# Patient Record
Sex: MALE | Race: WHITE | NOT HISPANIC OR LATINO | Employment: FULL TIME | URBAN - METROPOLITAN AREA
[De-identification: names, ages, dates, MRNs, and addresses within clinical notes are randomized per-mention and may not be internally consistent; named-entity substitution may affect disease eponyms.]

---

## 2017-08-11 LAB
ALBUMIN SERPL-MCNC: 5 G/DL (ref 3.6–4.8)
ALBUMIN/GLOB SERPL: 2.3 {RATIO} (ref 1.2–2.2)
ALP SERPL-CCNC: 64 IU/L (ref 39–117)
ALT SERPL-CCNC: 55 IU/L (ref 0–44)
AST SERPL-CCNC: 23 IU/L (ref 0–40)
BASOPHILS # BLD AUTO: 0 X10E3/UL (ref 0–0.2)
BASOPHILS NFR BLD AUTO: 0 %
BILIRUB SERPL-MCNC: 0.6 MG/DL (ref 0–1.2)
BUN SERPL-MCNC: 15 MG/DL (ref 8–27)
BUN/CREAT SERPL: 18 (ref 10–24)
CALCIUM SERPL-MCNC: 9.6 MG/DL (ref 8.6–10.2)
CHLORIDE SERPL-SCNC: 97 MMOL/L (ref 96–106)
CHOLEST SERPL-MCNC: 211 MG/DL (ref 100–199)
CO2 SERPL-SCNC: 23 MMOL/L (ref 18–29)
CREAT SERPL-MCNC: 0.84 MG/DL (ref 0.76–1.27)
EOSINOPHIL # BLD AUTO: 0.1 X10E3/UL (ref 0–0.4)
EOSINOPHIL NFR BLD AUTO: 1 %
ERYTHROCYTE [DISTWIDTH] IN BLOOD BY AUTOMATED COUNT: 13.1 % (ref 12.3–15.4)
FT4I SERPL CALC-MCNC: 2 (ref 1.2–4.9)
GLOBULIN SER CALC-MCNC: 2.2 G/DL (ref 1.5–4.5)
GLUCOSE SERPL-MCNC: 105 MG/DL (ref 65–99)
HBA1C MFR BLD: 5.7 % (ref 4.8–5.6)
HCT VFR BLD AUTO: 47.2 % (ref 37.5–51)
HCV AB S/CO SERPL IA: <0.1 S/CO RATIO (ref 0–0.9)
HDLC SERPL-MCNC: 58 MG/DL
HGB BLD-MCNC: 15.9 G/DL (ref 12.6–17.7)
HIV 1+2 AB+HIV1 P24 AG SERPL QL IA: NON REACTIVE
IMM GRANULOCYTES # BLD: 0 X10E3/UL (ref 0–0.1)
IMM GRANULOCYTES NFR BLD: 0 %
LABORATORY COMMENT REPORT: NORMAL
LDLC SERPL CALC-MCNC: 130 MG/DL (ref 0–99)
LYMPHOCYTES # BLD AUTO: 2.3 X10E3/UL (ref 0.7–3.1)
LYMPHOCYTES NFR BLD AUTO: 39 %
MCH RBC QN AUTO: 30.9 PG (ref 26.6–33)
MCHC RBC AUTO-ENTMCNC: 33.7 G/DL (ref 31.5–35.7)
MCV RBC AUTO: 92 FL (ref 79–97)
MONOCYTES # BLD AUTO: 0.5 X10E3/UL (ref 0.1–0.9)
MONOCYTES NFR BLD AUTO: 8 %
NEUTROPHILS # BLD AUTO: 3 X10E3/UL (ref 1.4–7)
NEUTROPHILS NFR BLD AUTO: 52 %
ONE SPECIMEN IDENTIFIER: NORMAL
PLATELET # BLD AUTO: 168 X10E3/UL (ref 150–379)
POTASSIUM SERPL-SCNC: 4.5 MMOL/L (ref 3.5–5.2)
PROT SERPL-MCNC: 7.2 G/DL (ref 6–8.5)
PSA SERPL-MCNC: 0.2 NG/ML (ref 0–4)
RBC # BLD AUTO: 5.14 X10E6/UL (ref 4.14–5.8)
SODIUM SERPL-SCNC: 137 MMOL/L (ref 134–144)
T3RU NFR SERPL: 28 % (ref 24–39)
T4 SERPL-MCNC: 7.2 UG/DL (ref 4.5–12)
TRIGL SERPL-MCNC: 116 MG/DL (ref 0–149)
TSH SERPL DL<=0.005 MIU/L-ACNC: 2.35 UIU/ML (ref 0.45–4.5)
VLDLC SERPL CALC-MCNC: 23 MG/DL (ref 5–40)
WBC # BLD AUTO: 5.8 X10E3/UL (ref 3.4–10.8)

## 2017-10-27 ENCOUNTER — OFFICE VISIT (OUTPATIENT)
Dept: FAMILY MEDICINE CLINIC | Facility: CLINIC | Age: 62
End: 2017-10-27

## 2017-10-27 VITALS
HEIGHT: 66 IN | HEART RATE: 86 BPM | DIASTOLIC BLOOD PRESSURE: 86 MMHG | BODY MASS INDEX: 31 KG/M2 | SYSTOLIC BLOOD PRESSURE: 122 MMHG | WEIGHT: 192.9 LBS | OXYGEN SATURATION: 96 %

## 2017-10-27 DIAGNOSIS — I10 ESSENTIAL HYPERTENSION: Primary | ICD-10-CM

## 2017-10-27 DIAGNOSIS — E78.5 DYSLIPIDEMIA: ICD-10-CM

## 2017-10-27 DIAGNOSIS — R73.01 IFG (IMPAIRED FASTING GLUCOSE): ICD-10-CM

## 2017-10-27 DIAGNOSIS — K21.9 GASTROESOPHAGEAL REFLUX DISEASE, ESOPHAGITIS PRESENCE NOT SPECIFIED: ICD-10-CM

## 2017-10-27 DIAGNOSIS — J30.9 CHRONIC ALLERGIC RHINITIS, UNSPECIFIED SEASONALITY, UNSPECIFIED TRIGGER: ICD-10-CM

## 2017-10-27 PROCEDURE — 99204 OFFICE O/P NEW MOD 45 MIN: CPT | Performed by: INTERNAL MEDICINE

## 2017-10-27 RX ORDER — ROSUVASTATIN CALCIUM 10 MG/1
10 TABLET, COATED ORAL DAILY
COMMUNITY
End: 2021-08-20 | Stop reason: SDUPTHER

## 2017-10-27 RX ORDER — PANTOPRAZOLE SODIUM 40 MG/1
40 TABLET, DELAYED RELEASE ORAL DAILY
Qty: 30 TABLET | Refills: 2 | Status: SHIPPED | OUTPATIENT
Start: 2017-10-27 | End: 2021-08-20

## 2017-10-27 RX ORDER — RANITIDINE 150 MG/1
150 TABLET ORAL 2 TIMES DAILY
COMMUNITY
End: 2017-10-27 | Stop reason: ALTCHOICE

## 2017-10-27 RX ORDER — IRBESARTAN 150 MG/1
150 TABLET ORAL NIGHTLY
COMMUNITY
End: 2019-10-11

## 2017-11-21 ENCOUNTER — TRANSCRIBE ORDERS (OUTPATIENT)
Dept: GASTROENTEROLOGY | Facility: CLINIC | Age: 62
End: 2017-11-21

## 2017-11-21 DIAGNOSIS — K21.9 GASTROESOPHAGEAL REFLUX DISEASE, ESOPHAGITIS PRESENCE NOT SPECIFIED: ICD-10-CM

## 2017-11-21 DIAGNOSIS — Z86.010 HX OF ADENOMATOUS COLONIC POLYPS: Primary | ICD-10-CM

## 2017-11-21 PROBLEM — Z86.0101 HX OF ADENOMATOUS COLONIC POLYPS: Status: ACTIVE | Noted: 2017-11-21

## 2017-12-08 ENCOUNTER — HOSPITAL ENCOUNTER (OUTPATIENT)
Facility: HOSPITAL | Age: 62
Setting detail: HOSPITAL OUTPATIENT SURGERY
Discharge: HOME | End: 2017-12-08
Attending: INTERNAL MEDICINE | Admitting: INTERNAL MEDICINE

## 2017-12-08 ENCOUNTER — ANESTHESIA EVENT (OUTPATIENT)
Dept: GASTROENTEROLOGY | Facility: HOSPITAL | Age: 62
End: 2017-12-08

## 2017-12-08 ENCOUNTER — ANESTHESIA (OUTPATIENT)
Dept: GASTROENTEROLOGY | Facility: HOSPITAL | Age: 62
End: 2017-12-08

## 2017-12-08 VITALS
WEIGHT: 184.4 LBS | OXYGEN SATURATION: 95 % | RESPIRATION RATE: 16 BRPM | SYSTOLIC BLOOD PRESSURE: 121 MMHG | DIASTOLIC BLOOD PRESSURE: 80 MMHG | TEMPERATURE: 98.5 F | HEIGHT: 66 IN | HEART RATE: 61 BPM | BODY MASS INDEX: 29.63 KG/M2

## 2017-12-08 DIAGNOSIS — K21.9 GASTROESOPHAGEAL REFLUX DISEASE, ESOPHAGITIS PRESENCE NOT SPECIFIED: ICD-10-CM

## 2017-12-08 DIAGNOSIS — Z86.010 HX OF ADENOMATOUS COLONIC POLYPS: ICD-10-CM

## 2017-12-08 PROCEDURE — 88312 SPECIAL STAINS GROUP 1: CPT | Performed by: INTERNAL MEDICINE

## 2017-12-08 PROCEDURE — 87081 CULTURE SCREEN ONLY: CPT | Performed by: INTERNAL MEDICINE

## 2017-12-08 PROCEDURE — 45380 COLONOSCOPY AND BIOPSY: CPT | Performed by: INTERNAL MEDICINE

## 2017-12-08 PROCEDURE — 88305 TISSUE EXAM BY PATHOLOGIST: CPT | Performed by: INTERNAL MEDICINE

## 2017-12-08 PROCEDURE — 25010000002 PROPOFOL 10 MG/ML EMULSION: Performed by: ANESTHESIOLOGY

## 2017-12-08 PROCEDURE — 43239 EGD BIOPSY SINGLE/MULTIPLE: CPT | Performed by: INTERNAL MEDICINE

## 2017-12-08 RX ORDER — LORATADINE 10 MG/1
10 TABLET ORAL DAILY
COMMUNITY

## 2017-12-08 RX ORDER — FLUTICASONE PROPIONATE 50 MCG
2 SPRAY, SUSPENSION (ML) NASAL DAILY
COMMUNITY

## 2017-12-08 RX ORDER — SODIUM CHLORIDE, SODIUM LACTATE, POTASSIUM CHLORIDE, CALCIUM CHLORIDE 600; 310; 30; 20 MG/100ML; MG/100ML; MG/100ML; MG/100ML
30 INJECTION, SOLUTION INTRAVENOUS CONTINUOUS PRN
Status: DISCONTINUED | OUTPATIENT
Start: 2017-12-08 | End: 2017-12-08 | Stop reason: HOSPADM

## 2017-12-08 RX ORDER — PROPOFOL 10 MG/ML
VIAL (ML) INTRAVENOUS CONTINUOUS PRN
Status: DISCONTINUED | OUTPATIENT
Start: 2017-12-08 | End: 2017-12-08 | Stop reason: SURG

## 2017-12-08 RX ORDER — PROPOFOL 10 MG/ML
VIAL (ML) INTRAVENOUS AS NEEDED
Status: DISCONTINUED | OUTPATIENT
Start: 2017-12-08 | End: 2017-12-08 | Stop reason: SURG

## 2017-12-08 RX ORDER — LIDOCAINE HYDROCHLORIDE 20 MG/ML
INJECTION, SOLUTION INFILTRATION; PERINEURAL AS NEEDED
Status: DISCONTINUED | OUTPATIENT
Start: 2017-12-08 | End: 2017-12-08 | Stop reason: SURG

## 2017-12-08 RX ORDER — SODIUM CHLORIDE 0.9 % (FLUSH) 0.9 %
1-10 SYRINGE (ML) INJECTION AS NEEDED
Status: DISCONTINUED | OUTPATIENT
Start: 2017-12-08 | End: 2017-12-08 | Stop reason: HOSPADM

## 2017-12-08 RX ADMIN — SODIUM CHLORIDE, POTASSIUM CHLORIDE, SODIUM LACTATE AND CALCIUM CHLORIDE 30 ML/HR: 600; 310; 30; 20 INJECTION, SOLUTION INTRAVENOUS at 13:59

## 2017-12-08 RX ADMIN — PROPOFOL 100 MCG/KG/MIN: 10 INJECTION, EMULSION INTRAVENOUS at 15:30

## 2017-12-08 RX ADMIN — LIDOCAINE HYDROCHLORIDE 60 MG: 20 INJECTION, SOLUTION INFILTRATION; PERINEURAL at 15:34

## 2017-12-08 RX ADMIN — PROPOFOL 100 MG: 10 INJECTION, EMULSION INTRAVENOUS at 15:30

## 2017-12-08 NOTE — PLAN OF CARE
Problem: Patient Care Overview (Adult)  Goal: Plan of Care Review  Outcome: Ongoing (interventions implemented as appropriate)    12/08/17 1338   Coping/Psychosocial Response Interventions   Plan Of Care Reviewed With patient       Goal: Adult Individualization and Mutuality  Outcome: Ongoing (interventions implemented as appropriate)  Goal: Discharge Needs Assessment  Outcome: Ongoing (interventions implemented as appropriate)    12/08/17 1338   Discharge Needs Assessment   Concerns To Be Addressed no discharge needs identified   Discharge Disposition home or self-care   Living Environment   Transportation Available car;family or friend will provide         Problem: GI Endoscopy (Adult)  Goal: Signs and Symptoms of Listed Potential Problems Will be Absent or Manageable (GI Endoscopy)  Outcome: Ongoing (interventions implemented as appropriate)    12/08/17 1338   GI Endoscopy   Problems Assessed (GI Endoscopy) all   Problems Present (GI Endoscopy) none

## 2017-12-08 NOTE — ANESTHESIA POSTPROCEDURE EVALUATION
Patient: Beto Bassett MD    Procedure Summary     Date Anesthesia Start Anesthesia Stop Room / Location    12/08/17 1527 1608  [facility] /  [facility]       Procedure Diagnosis Surgeon Provider    COLONOSCOPY TO CECUM AND INTO TERMINAL ILEUM WITH COLD BIOPSY POLYPECTOMY (N/A ); ESOPHAGOGASTRODUODENOSCOPY WITH COLD BIOPSIES (N/A Esophagus) Hx of adenomatous colonic polyps; Gastroesophageal reflux disease, esophagitis presence not specified  (Hx of adenomatous colonic polyps [Z86.010]; Gastroesophageal reflux disease, esophagitis presence not specified [K21.9]) MD Kristal Mcclellan MD          Anesthesia Type: MAC  Last vitals  BP   100/73 (12/08/17 1608)   Temp   36.9 °C (98.5 °F) (12/08/17 1353)   Pulse   72 (12/08/17 1608)   Resp   14 (12/08/17 1608)     SpO2   95 % (12/08/17 1608)     Post Anesthesia Care and Evaluation    Patient location during evaluation: bedside  Patient participation: complete - patient participated  Level of consciousness: awake  Pain management: adequate  Airway patency: patent  Anesthetic complications: No anesthetic complications    Cardiovascular status: acceptable  Respiratory status: acceptable  Hydration status: acceptable

## 2017-12-08 NOTE — ANESTHESIA PREPROCEDURE EVALUATION
Anesthesia Evaluation            Airway   Mallampati: III  TM distance: >3 FB  Neck ROM: full  no difficulty expected  Dental - normal exam     Pulmonary - normal exam   Cardiovascular - normal exam    (+) hypertension, hyperlipidemia      Neuro/Psych  GI/Hepatic/Renal/Endo    (+)  GERD,     Musculoskeletal     Abdominal    Substance History      OB/GYN          Other                                        Anesthesia Plan    ASA 2     MAC     intravenous induction   Anesthetic plan and risks discussed with patient.

## 2017-12-09 LAB — UREASE TISS QL: NEGATIVE

## 2017-12-11 LAB
CYTO UR: NORMAL
LAB AP CASE REPORT: NORMAL
Lab: NORMAL
PATH REPORT.FINAL DX SPEC: NORMAL
PATH REPORT.GROSS SPEC: NORMAL

## 2018-04-16 ENCOUNTER — RESULTS ENCOUNTER (OUTPATIENT)
Dept: FAMILY MEDICINE CLINIC | Facility: CLINIC | Age: 63
End: 2018-04-16

## 2018-04-16 DIAGNOSIS — R73.01 IFG (IMPAIRED FASTING GLUCOSE): ICD-10-CM

## 2018-04-16 DIAGNOSIS — E78.5 DYSLIPIDEMIA: ICD-10-CM

## 2018-04-16 DIAGNOSIS — I10 ESSENTIAL HYPERTENSION: ICD-10-CM

## 2019-06-10 RX ORDER — IRBESARTAN 150 MG/1
150 TABLET ORAL DAILY
Qty: 90 TABLET | Refills: 3 | Status: SHIPPED | OUTPATIENT
Start: 2019-06-10 | End: 2021-12-20

## 2019-06-10 RX ORDER — ROSUVASTATIN CALCIUM 10 MG/1
10 TABLET, COATED ORAL DAILY
Qty: 90 TABLET | Refills: 3 | Status: SHIPPED | OUTPATIENT
Start: 2019-06-10 | End: 2020-06-10 | Stop reason: SDUPTHER

## 2019-06-10 RX ORDER — PANTOPRAZOLE SODIUM 40 MG/1
40 TABLET, DELAYED RELEASE ORAL DAILY
Qty: 90 TABLET | Refills: 3 | Status: SHIPPED | OUTPATIENT
Start: 2019-06-10 | End: 2020-08-04 | Stop reason: SDUPTHER

## 2019-09-05 RX ORDER — LOSARTAN POTASSIUM 25 MG/1
25 TABLET ORAL DAILY
Qty: 90 TABLET | Refills: 0 | Status: SHIPPED | OUTPATIENT
Start: 2019-09-05 | End: 2019-12-13 | Stop reason: SDUPTHER

## 2019-10-03 DIAGNOSIS — I10 ESSENTIAL HYPERTENSION: ICD-10-CM

## 2019-10-03 DIAGNOSIS — E78.00 HYPERCHOLESTEREMIA: ICD-10-CM

## 2019-10-03 DIAGNOSIS — E78.00 HYPERCHOLESTEREMIA: Primary | ICD-10-CM

## 2019-10-04 LAB
ALBUMIN SERPL-MCNC: 5 G/DL (ref 3.5–5.2)
ALBUMIN/GLOB SERPL: 2.1 G/DL
ALP SERPL-CCNC: 61 U/L (ref 39–117)
ALT SERPL-CCNC: 28 U/L (ref 1–41)
AST SERPL-CCNC: 18 U/L (ref 1–40)
BASOPHILS # BLD AUTO: (no result) 10*3/UL
BILIRUB SERPL-MCNC: 0.7 MG/DL (ref 0.2–1.2)
BUN SERPL-MCNC: 15 MG/DL (ref 8–23)
BUN/CREAT SERPL: 15 (ref 7–25)
CALCIUM SERPL-MCNC: 9.8 MG/DL (ref 8.6–10.5)
CHLORIDE SERPL-SCNC: 100 MMOL/L (ref 98–107)
CHOLEST SERPL-MCNC: 189 MG/DL (ref 0–200)
CO2 SERPL-SCNC: 23.9 MMOL/L (ref 22–29)
CREAT SERPL-MCNC: 1 MG/DL (ref 0.76–1.27)
DIFFERENTIAL COMMENT: NORMAL
EOSINOPHIL # BLD AUTO: (no result) 10*3/UL
EOSINOPHIL # BLD MANUAL: 0.07 10*3/MM3 (ref 0–0.4)
EOSINOPHIL NFR BLD AUTO: (no result) %
EOSINOPHIL NFR BLD MANUAL: 1 % (ref 0.3–6.2)
ERYTHROCYTE [DISTWIDTH] IN BLOOD BY AUTOMATED COUNT: 12.3 % (ref 12.3–15.4)
EST. AVERAGE GLUCOSE BLD GHB EST-MCNC: 114.02 MG/DL
FT4I SERPL CALC-MCNC: 2.1 (ref 1.2–4.9)
GLOBULIN SER CALC-MCNC: 2.4 GM/DL
GLUCOSE SERPL-MCNC: 109 MG/DL (ref 65–99)
HBA1C MFR BLD: 5.6 % (ref 4.8–5.6)
HBV SURFACE AB SER-ACNC: 75.7 MIU/ML
HCT VFR BLD AUTO: 47.4 % (ref 37.5–51)
HCV AB S/CO SERPL IA: <0.1 S/CO RATIO (ref 0–0.9)
HDLC SERPL-MCNC: 57 MG/DL (ref 40–60)
HGB BLD-MCNC: 15.6 G/DL (ref 13–17.7)
HIV 1+2 AB+HIV1 P24 AG SERPL QL IA: NON REACTIVE
LABORATORY COMMENT REPORT: NORMAL
LDLC SERPL CALC-MCNC: 113 MG/DL (ref 0–100)
LYMPHOCYTES # BLD AUTO: (no result) 10*3/UL
LYMPHOCYTES # BLD MANUAL: 1.76 10*3/MM3 (ref 0.7–3.1)
LYMPHOCYTES NFR BLD AUTO: (no result) %
LYMPHOCYTES NFR BLD MANUAL: 27 % (ref 19.6–45.3)
MCH RBC QN AUTO: 29.9 PG (ref 26.6–33)
MCHC RBC AUTO-ENTMCNC: 32.9 G/DL (ref 31.5–35.7)
MCV RBC AUTO: 90.8 FL (ref 79–97)
MONOCYTES # BLD MANUAL: 0.39 10*3/MM3 (ref 0.1–0.9)
MONOCYTES NFR BLD AUTO: (no result) %
MONOCYTES NFR BLD MANUAL: 6 % (ref 5–12)
NEUTROPHILS # BLD MANUAL: 4.23 10*3/MM3 (ref 1.7–7)
NEUTROPHILS NFR BLD AUTO: (no result) %
NEUTROPHILS NFR BLD MANUAL: 65 % (ref 42.7–76)
PLATELET # BLD AUTO: 138 10*3/MM3 (ref 140–450)
PLATELET BLD QL SMEAR: NORMAL
POTASSIUM SERPL-SCNC: 4.5 MMOL/L (ref 3.5–5.2)
PROT SERPL-MCNC: 7.4 G/DL (ref 6–8.5)
PSA SERPL-MCNC: 0.2 NG/ML (ref 0–4)
RBC # BLD AUTO: 5.22 10*6/MM3 (ref 4.14–5.8)
RBC MORPH BLD: NORMAL
SODIUM SERPL-SCNC: 141 MMOL/L (ref 136–145)
T3RU NFR SERPL: 29 % (ref 24–39)
T4 SERPL-MCNC: 7.4 UG/DL (ref 4.5–12)
TRIGL SERPL-MCNC: 94 MG/DL (ref 0–150)
TSH SERPL DL<=0.005 MIU/L-ACNC: 2.14 UIU/ML (ref 0.45–4.5)
VLDLC SERPL CALC-MCNC: 18.8 MG/DL
WBC # BLD AUTO: 6.51 10*3/MM3 (ref 3.4–10.8)

## 2019-10-11 ENCOUNTER — OFFICE VISIT (OUTPATIENT)
Dept: FAMILY MEDICINE CLINIC | Facility: CLINIC | Age: 64
End: 2019-10-11

## 2019-10-11 VITALS
SYSTOLIC BLOOD PRESSURE: 112 MMHG | DIASTOLIC BLOOD PRESSURE: 68 MMHG | HEART RATE: 73 BPM | BODY MASS INDEX: 29.41 KG/M2 | TEMPERATURE: 97.8 F | HEIGHT: 66 IN | WEIGHT: 183 LBS | OXYGEN SATURATION: 97 %

## 2019-10-11 DIAGNOSIS — E78.00 PURE HYPERCHOLESTEROLEMIA: ICD-10-CM

## 2019-10-11 DIAGNOSIS — I10 ESSENTIAL HYPERTENSION: ICD-10-CM

## 2019-10-11 DIAGNOSIS — K21.00 GASTROESOPHAGEAL REFLUX DISEASE WITH ESOPHAGITIS: ICD-10-CM

## 2019-10-11 DIAGNOSIS — Z00.00 PHYSICAL EXAM, ANNUAL: Primary | ICD-10-CM

## 2019-10-11 PROCEDURE — 99397 PER PM REEVAL EST PAT 65+ YR: CPT | Performed by: INTERNAL MEDICINE

## 2019-10-11 NOTE — PROGRESS NOTES
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     History of Present Illness   Patient was seen for a physical.  Diet and physical activity were discussed.    Dictated utilizing Dragon dictation. If there are questions or for further clarification, please contact me.  The following portions of the patient's history were reviewed and updated as appropriate: allergies, current medications, past family history, past medical history, past social history, past surgical history and problem list.    Review of Systems   Constitutional: Negative for fatigue and fever.   HENT: Positive for congestion. Negative for trouble swallowing.    Eyes: Negative for discharge and visual disturbance.   Respiratory: Negative for choking and shortness of breath.    Cardiovascular: Negative for chest pain and palpitations.   Gastrointestinal: Negative for abdominal pain and blood in stool.   Endocrine: Negative.    Genitourinary: Negative for genital sores and hematuria.   Musculoskeletal: Negative for gait problem and joint swelling.   Skin: Negative for color change, pallor, rash and wound.   Allergic/Immunologic: Positive for environmental allergies. Negative for immunocompromised state.   Neurological: Negative for facial asymmetry and speech difficulty.   Psychiatric/Behavioral: Negative for hallucinations and suicidal ideas.       Objective   Physical Exam   Constitutional: He is oriented to person, place, and time. He appears well-developed and well-nourished. No distress.   HENT:   Head: Normocephalic and atraumatic.   Right Ear: External ear normal.   Left Ear: External ear normal.   Nose: Nose normal.   Mouth/Throat: Oropharynx is clear and moist. No oropharyngeal exudate.   Eyes: Conjunctivae and EOM are normal. Pupils are equal, round, and reactive to light. Right eye exhibits no discharge. Left eye exhibits no discharge. No scleral icterus.   Neck: Normal range of motion. Neck supple. No JVD present. No tracheal deviation  present. No thyromegaly present.   Cardiovascular: Normal rate, regular rhythm and normal heart sounds.   Pulmonary/Chest: Effort normal and breath sounds normal. No stridor. No respiratory distress. He has no wheezes. He has no rales. He exhibits no tenderness.   Abdominal: Soft. Bowel sounds are normal. He exhibits no distension and no mass. There is no tenderness. There is no rebound and no guarding. No hernia.   1.  Small umbilical hernia #2 abdominal diathesis   Musculoskeletal: Normal range of motion. He exhibits no edema, tenderness or deformity.   Lymphadenopathy:     He has no cervical adenopathy.   Neurological: He is alert and oriented to person, place, and time. He displays normal reflexes. No cranial nerve deficit or sensory deficit. He exhibits normal muscle tone. Coordination normal.   Skin: Skin is warm and dry. No rash noted. He is not diaphoretic. No erythema. No pallor.   Psychiatric: He has a normal mood and affect. His behavior is normal. Judgment and thought content normal.   Nursing note and vitals reviewed.      Assessment/Plan #1 physical #2 continue to monitor blood pressure at home  Beto was seen today for establish care.    Diagnoses and all orders for this visit:    Physical exam, annual    Gastroesophageal reflux disease with esophagitis    Pure hypercholesterolemia    Essential hypertension

## 2019-12-16 RX ORDER — LOSARTAN POTASSIUM 25 MG/1
25 TABLET ORAL DAILY
Qty: 90 TABLET | Refills: 0 | Status: SHIPPED | OUTPATIENT
Start: 2019-12-16 | End: 2020-03-11 | Stop reason: SDUPTHER

## 2020-03-12 RX ORDER — LOSARTAN POTASSIUM 25 MG/1
25 TABLET ORAL DAILY
Qty: 90 TABLET | Refills: 0 | Status: SHIPPED | OUTPATIENT
Start: 2020-03-12 | End: 2020-06-10 | Stop reason: SDUPTHER

## 2020-04-27 DIAGNOSIS — U07.1 COVID-19: Primary | ICD-10-CM

## 2020-04-28 LAB — SARS-COV-2 IGG SERPL QL IA: NEGATIVE

## 2020-06-10 RX ORDER — ROSUVASTATIN CALCIUM 10 MG/1
10 TABLET, COATED ORAL DAILY
Qty: 90 TABLET | Refills: 3 | Status: SHIPPED | OUTPATIENT
Start: 2020-06-10 | End: 2021-08-20 | Stop reason: SDUPTHER

## 2020-06-10 RX ORDER — LOSARTAN POTASSIUM 25 MG/1
25 TABLET ORAL DAILY
Qty: 90 TABLET | Refills: 0 | Status: SHIPPED | OUTPATIENT
Start: 2020-06-10 | End: 2020-09-09 | Stop reason: SDUPTHER

## 2020-08-04 RX ORDER — PANTOPRAZOLE SODIUM 40 MG/1
40 TABLET, DELAYED RELEASE ORAL DAILY
Qty: 90 TABLET | Refills: 3 | OUTPATIENT
Start: 2020-08-04 | End: 2021-07-13 | Stop reason: SDUPTHER

## 2020-09-10 RX ORDER — LOSARTAN POTASSIUM 25 MG/1
25 TABLET ORAL DAILY
Qty: 90 TABLET | Refills: 0 | Status: SHIPPED | OUTPATIENT
Start: 2020-09-10 | End: 2020-12-11 | Stop reason: SDUPTHER

## 2020-10-22 DIAGNOSIS — Z80.42 FAMILY HX OF PROSTATE CANCER: ICD-10-CM

## 2020-10-22 DIAGNOSIS — Z80.42 FAMILY HX OF PROSTATE CANCER: Primary | ICD-10-CM

## 2020-10-22 DIAGNOSIS — Z80.42 FAMILY HISTORY OF PROSTATE CANCER: Primary | ICD-10-CM

## 2020-10-23 LAB
APPEARANCE UR: CLEAR
BACTERIA #/AREA URNS HPF: NORMAL /HPF
BILIRUB UR QL STRIP: NEGATIVE
CASTS URNS MICRO: NORMAL
COLOR UR: YELLOW
EPI CELLS #/AREA URNS HPF: NORMAL /HPF
GLUCOSE UR QL: NEGATIVE
HGB UR QL STRIP: NEGATIVE
KETONES UR QL STRIP: NEGATIVE
LEUKOCYTE ESTERASE UR QL STRIP: NEGATIVE
NITRITE UR QL STRIP: NEGATIVE
PH UR STRIP: 6.5 [PH] (ref 5–8)
PROT UR QL STRIP: NEGATIVE
RBC #/AREA URNS HPF: NORMAL /HPF
SP GR UR: 1.02 (ref 1–1.03)
UROBILINOGEN UR STRIP-MCNC: NORMAL MG/DL
WBC #/AREA URNS HPF: NORMAL /HPF

## 2020-10-24 LAB
BACTERIA UR CULT: NORMAL
BACTERIA UR CULT: NORMAL

## 2020-12-14 RX ORDER — LOSARTAN POTASSIUM 25 MG/1
25 TABLET ORAL DAILY
Qty: 90 TABLET | Refills: 0 | Status: SHIPPED | OUTPATIENT
Start: 2020-12-14 | End: 2021-04-06 | Stop reason: SDUPTHER

## 2020-12-17 ENCOUNTER — IMMUNIZATION (OUTPATIENT)
Dept: VACCINE CLINIC | Facility: HOSPITAL | Age: 65
End: 2020-12-17

## 2020-12-17 PROCEDURE — 0001A: CPT | Performed by: INTERNAL MEDICINE

## 2020-12-17 PROCEDURE — 91300 HC SARSCOV02 VAC 30MCG/0.3ML IM: CPT | Performed by: INTERNAL MEDICINE

## 2021-01-07 ENCOUNTER — IMMUNIZATION (OUTPATIENT)
Dept: VACCINE CLINIC | Facility: HOSPITAL | Age: 66
End: 2021-01-07

## 2021-01-07 PROCEDURE — 0002A: CPT | Performed by: INTERNAL MEDICINE

## 2021-01-07 PROCEDURE — 0001A: CPT | Performed by: INTERNAL MEDICINE

## 2021-01-07 PROCEDURE — 91300 HC SARSCOV02 VAC 30MCG/0.3ML IM: CPT | Performed by: INTERNAL MEDICINE

## 2021-04-06 RX ORDER — LOSARTAN POTASSIUM 25 MG/1
25 TABLET ORAL DAILY
Qty: 90 TABLET | Refills: 0 | Status: SHIPPED | OUTPATIENT
Start: 2021-04-06 | End: 2021-08-20

## 2021-08-05 RX ORDER — LOSARTAN POTASSIUM 25 MG/1
25 TABLET ORAL DAILY
Qty: 30 TABLET | Refills: 0 | Status: SHIPPED | OUTPATIENT
Start: 2021-08-05 | End: 2021-08-20

## 2021-08-10 RX ORDER — ROSUVASTATIN CALCIUM 10 MG/1
10 TABLET, COATED ORAL DAILY
Qty: 30 TABLET | Refills: 11 | OUTPATIENT
Start: 2021-08-10 | End: 2021-08-20 | Stop reason: SDUPTHER

## 2021-08-20 ENCOUNTER — TELEPHONE (OUTPATIENT)
Dept: FAMILY MEDICINE CLINIC | Facility: CLINIC | Age: 66
End: 2021-08-20

## 2021-08-20 RX ORDER — PANTOPRAZOLE SODIUM 40 MG/1
40 TABLET, DELAYED RELEASE ORAL DAILY
Qty: 90 TABLET | Refills: 2 | Status: SHIPPED | OUTPATIENT
Start: 2021-08-20 | End: 2022-06-28 | Stop reason: SDUPTHER

## 2021-08-20 RX ORDER — ROSUVASTATIN CALCIUM 10 MG/1
10 TABLET, COATED ORAL DAILY
Qty: 90 TABLET | Refills: 1 | Status: SHIPPED | OUTPATIENT
Start: 2021-08-20 | End: 2022-03-29 | Stop reason: SDUPTHER

## 2021-08-20 RX ORDER — LOSARTAN POTASSIUM 25 MG/1
25 TABLET ORAL DAILY
Qty: 90 TABLET | Refills: 1 | Status: SHIPPED | OUTPATIENT
Start: 2021-08-20 | End: 2021-12-20

## 2021-08-20 NOTE — TELEPHONE ENCOUNTER
PATIENT CALLED REQUESTING LABS FOR HIS ANNUAL ON 11/24/21 HE WILL DO LABS AT HIS OFFICE.     HE IS ALSO REQUESTING A MEDICATION ON REFILL OF       rosuvastatin (CRESTOR) 10 MG tablet    losartan (COZAAR) 25 MG tablet    pantoprazole (PROTONIX) 40 MG EC tablet    HE HAS 3 WEEKS LEFT    90 DAY SUPPLY     CALL BACK NUMBER [phone]    [facility]  [phone]

## 2021-11-18 DIAGNOSIS — Z00.00 ANNUAL PHYSICAL EXAM: Primary | ICD-10-CM

## 2021-11-18 DIAGNOSIS — Z12.5 ENCOUNTER FOR PROSTATE CANCER SCREENING: ICD-10-CM

## 2021-11-20 LAB
ALBUMIN SERPL-MCNC: 4.9 G/DL (ref 3.8–4.8)
ALBUMIN/GLOB SERPL: 2 {RATIO} (ref 1.2–2.2)
ALP SERPL-CCNC: 68 IU/L (ref 44–121)
ALT SERPL-CCNC: 55 IU/L (ref 0–44)
AST SERPL-CCNC: 32 IU/L (ref 0–40)
BASOPHILS # BLD AUTO: 0 X10E3/UL (ref 0–0.2)
BASOPHILS NFR BLD AUTO: 0 %
BILIRUB SERPL-MCNC: 0.8 MG/DL (ref 0–1.2)
BUN SERPL-MCNC: 14 MG/DL (ref 8–27)
BUN/CREAT SERPL: 13 (ref 10–24)
CALCIUM SERPL-MCNC: 9.7 MG/DL (ref 8.6–10.2)
CHLORIDE SERPL-SCNC: 100 MMOL/L (ref 96–106)
CHOLEST SERPL-MCNC: 219 MG/DL (ref 100–199)
CO2 SERPL-SCNC: 23 MMOL/L (ref 20–29)
CREAT SERPL-MCNC: 1.12 MG/DL (ref 0.76–1.27)
EOSINOPHIL # BLD AUTO: 0.1 X10E3/UL (ref 0–0.4)
EOSINOPHIL NFR BLD AUTO: 1 %
ERYTHROCYTE [DISTWIDTH] IN BLOOD BY AUTOMATED COUNT: 12.2 % (ref 11.6–15.4)
EST. AVERAGE GLUCOSE BLD GHB EST-MCNC: 117 MG/DL
FT4I SERPL CALC-MCNC: 1.8 (ref 1.2–4.9)
GLOBULIN SER CALC-MCNC: 2.5 G/DL (ref 1.5–4.5)
GLUCOSE SERPL-MCNC: 102 MG/DL (ref 65–99)
HBA1C MFR BLD: 5.7 % (ref 4.8–5.6)
HBV SURFACE AG SERPL QL IA: NEGATIVE
HCT VFR BLD AUTO: 49 % (ref 37.5–51)
HCV AB S/CO SERPL IA: <0.1 S/CO RATIO (ref 0–0.9)
HDLC SERPL-MCNC: 57 MG/DL
HGB BLD-MCNC: 17.3 G/DL (ref 13–17.7)
HIV 1+2 AB+HIV1 P24 AG SERPL QL IA: NON REACTIVE
IMM GRANULOCYTES # BLD AUTO: 0 X10E3/UL (ref 0–0.1)
IMM GRANULOCYTES NFR BLD AUTO: 0 %
LDLC SERPL CALC-MCNC: 145 MG/DL (ref 0–99)
LYMPHOCYTES # BLD AUTO: 4.2 X10E3/UL (ref 0.7–3.1)
LYMPHOCYTES NFR BLD AUTO: 55 %
MCH RBC QN AUTO: 32.5 PG (ref 26.6–33)
MCHC RBC AUTO-ENTMCNC: 35.3 G/DL (ref 31.5–35.7)
MCV RBC AUTO: 92 FL (ref 79–97)
MONOCYTES # BLD AUTO: 0.4 X10E3/UL (ref 0.1–0.9)
MONOCYTES NFR BLD AUTO: 5 %
NEUTROPHILS # BLD AUTO: 3 X10E3/UL (ref 1.4–7)
NEUTROPHILS NFR BLD AUTO: 39 %
PLATELET # BLD AUTO: 151 X10E3/UL (ref 150–450)
POTASSIUM SERPL-SCNC: 4.6 MMOL/L (ref 3.5–5.2)
PROT SERPL-MCNC: 7.4 G/DL (ref 6–8.5)
PSA SERPL-MCNC: 0.3 NG/ML (ref 0–4)
RBC # BLD AUTO: 5.33 X10E6/UL (ref 4.14–5.8)
SODIUM SERPL-SCNC: 138 MMOL/L (ref 134–144)
T3RU NFR SERPL: 26 % (ref 24–39)
T4 SERPL-MCNC: 7.1 UG/DL (ref 4.5–12)
TRIGL SERPL-MCNC: 97 MG/DL (ref 0–149)
TSH SERPL DL<=0.005 MIU/L-ACNC: 2.97 UIU/ML (ref 0.45–4.5)
VLDLC SERPL CALC-MCNC: 17 MG/DL (ref 5–40)
WBC # BLD AUTO: 7.8 X10E3/UL (ref 3.4–10.8)

## 2021-12-20 ENCOUNTER — OFFICE VISIT (OUTPATIENT)
Dept: FAMILY MEDICINE CLINIC | Facility: CLINIC | Age: 66
End: 2021-12-20

## 2021-12-20 VITALS
SYSTOLIC BLOOD PRESSURE: 102 MMHG | DIASTOLIC BLOOD PRESSURE: 70 MMHG | TEMPERATURE: 98 F | WEIGHT: 193.8 LBS | HEART RATE: 66 BPM | OXYGEN SATURATION: 96 % | HEIGHT: 66 IN | BODY MASS INDEX: 31.15 KG/M2

## 2021-12-20 DIAGNOSIS — Z00.00 PHYSICAL EXAM, ANNUAL: ICD-10-CM

## 2021-12-20 DIAGNOSIS — K21.00 GASTROESOPHAGEAL REFLUX DISEASE WITH ESOPHAGITIS WITHOUT HEMORRHAGE: Primary | ICD-10-CM

## 2021-12-20 PROCEDURE — 99397 PER PM REEVAL EST PAT 65+ YR: CPT | Performed by: INTERNAL MEDICINE

## 2021-12-20 NOTE — PROGRESS NOTES
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     Vitals:    12/20/21 1522   BP: 102/70   Pulse: 66   Temp: 98 °F (36.7 °C)   SpO2: 96%      Body mass index is 31.3 kg/m².     History of Present Illness   Patient was seen for physical.  Patient's diet and physical activity were discussed at this visit.  Patient's LDL was 145, patient's previous LDL was 113, patient is going to recheck LDL in 6 months.  Patient does have ALT elevated at 55.  AST was 32.  Patient is not a drinker and will continue to follow.  Patient states he had an ultrasound several years ago that was negative.  We will continue to monitor.    Dictated utilizing Dragon dictation. If there are questions or for further clarification, please contact me.  The following portions of the patient's history were reviewed and updated as appropriate: allergies, current medications, past family history, past medical history, past social history, past surgical history and problem list.    Review of Systems   Constitutional: Negative for fatigue and fever.   HENT: Positive for congestion. Negative for trouble swallowing.    Eyes: Negative for discharge and visual disturbance.   Respiratory: Negative for choking and shortness of breath.    Cardiovascular: Negative for chest pain and palpitations.   Gastrointestinal: Negative for abdominal pain and blood in stool.   Endocrine: Negative.    Genitourinary: Negative for genital sores and hematuria.   Musculoskeletal: Negative for gait problem and joint swelling.   Skin: Negative for color change, pallor, rash and wound.   Allergic/Immunologic: Positive for environmental allergies. Negative for immunocompromised state.   Neurological: Negative for facial asymmetry and speech difficulty.   Psychiatric/Behavioral: Negative for hallucinations and suicidal ideas.       Objective   Physical Exam  Vitals and nursing note reviewed.   Constitutional:       General: He is not in acute distress.     Appearance: Normal  appearance. He is well-developed. He is not ill-appearing, toxic-appearing or diaphoretic.   HENT:      Head: Normocephalic and atraumatic.      Right Ear: Tympanic membrane, ear canal and external ear normal. There is no impacted cerumen.      Left Ear: Tympanic membrane, ear canal and external ear normal. There is no impacted cerumen.      Nose: Nose normal. No congestion or rhinorrhea.      Mouth/Throat:      Mouth: Mucous membranes are moist.      Pharynx: Oropharynx is clear. No oropharyngeal exudate or posterior oropharyngeal erythema.   Eyes:      General: No scleral icterus.        Right eye: No discharge.         Left eye: No discharge.      Extraocular Movements: Extraocular movements intact.      Conjunctiva/sclera: Conjunctivae normal.      Pupils: Pupils are equal, round, and reactive to light.   Neck:      Thyroid: No thyromegaly.      Vascular: No carotid bruit or JVD.      Trachea: No tracheal deviation.   Cardiovascular:      Rate and Rhythm: Normal rate and regular rhythm.      Heart sounds: Normal heart sounds. No murmur heard.  No friction rub. No gallop.    Pulmonary:      Effort: Pulmonary effort is normal. No respiratory distress.      Breath sounds: Normal breath sounds. No stridor. No wheezing, rhonchi or rales.   Chest:      Chest wall: No tenderness.   Abdominal:      General: Bowel sounds are normal. There is no distension.      Palpations: Abdomen is soft. There is no mass.      Tenderness: There is no abdominal tenderness. There is no right CVA tenderness, left CVA tenderness, guarding or rebound.      Hernia: No hernia is present.   Musculoskeletal:         General: No swelling, tenderness, deformity or signs of injury. Normal range of motion.      Cervical back: Normal range of motion and neck supple. No rigidity. No muscular tenderness.      Right lower leg: No edema.      Left lower leg: No edema.   Lymphadenopathy:      Cervical: No cervical adenopathy.   Skin:     General: Skin is  warm and dry.      Coloration: Skin is not jaundiced or pale.      Findings: No bruising, erythema, lesion or rash.   Neurological:      General: No focal deficit present.      Mental Status: He is alert and oriented to person, place, and time. Mental status is at baseline.      Cranial Nerves: No cranial nerve deficit.      Sensory: No sensory deficit.      Motor: No weakness or abnormal muscle tone.      Coordination: Coordination normal.      Gait: Gait normal.      Deep Tendon Reflexes: Reflexes normal.   Psychiatric:         Mood and Affect: Mood normal.         Behavior: Behavior normal.         Thought Content: Thought content normal.         Judgment: Judgment normal.         Assessment/Plan #1 physical #2 labs  Problems Addressed this Visit        Gastrointestinal Abdominal     GERD (gastroesophageal reflux disease) - Primary      Other Visit Diagnoses     Physical exam, annual          Diagnoses     Diagnosis Codes Comments    Gastroesophageal reflux disease with esophagitis without hemorrhage    -  Primary ICD-10-CM: K21.00  ICD-9-CM: 530.81, 530.10     Physical exam, annual     ICD-10-CM: Z00.00  ICD-9-CM: V70.0

## 2022-01-26 ENCOUNTER — DOCUMENTATION (OUTPATIENT)
Dept: OBSTETRICS AND GYNECOLOGY | Facility: CLINIC | Age: 67
End: 2022-01-26

## 2022-01-26 DIAGNOSIS — I10 PRIMARY HYPERTENSION: Primary | ICD-10-CM

## 2022-01-26 DIAGNOSIS — I10 PRIMARY HYPERTENSION: ICD-10-CM

## 2022-01-26 RX ORDER — LOSARTAN POTASSIUM 25 MG/1
25 TABLET ORAL DAILY
Qty: 90 TABLET | Refills: 1 | Status: SHIPPED | OUTPATIENT
Start: 2022-01-26 | End: 2022-01-28 | Stop reason: SDUPTHER

## 2022-01-26 RX ORDER — LOSARTAN POTASSIUM 25 MG/1
25 TABLET ORAL DAILY
Qty: 90 TABLET | Refills: 1 | Status: SHIPPED | OUTPATIENT
Start: 2022-01-26 | End: 2022-01-26 | Stop reason: SDUPTHER

## 2022-01-27 NOTE — H&P
"[facility]  Pre Procedure History & Physical    Chief Complaint:   Dyspepsia, h/o colon polyps.    Subjective     HPI:   62 y.o. male with dyspepsia and a h/o colon polyps. He last had a colonoscopy in 2/09.    Past Medical History:   Past Medical History:   Diagnosis Date   • Hyperlipemia    • Hypertension    • Seasonal allergies        Family History:  Family History   Problem Relation Age of Onset   • Diabetes Mother      from liver transplant   • Lymphoma Mother      from liver transplant   • Hypertension Father    • Prostate cancer Father    • Stroke Father    • No Known Problems Sister    • No Known Problems Brother        Social History:   reports that he has never smoked. He has never used smokeless tobacco. He reports that he drinks about 4.2 oz of alcohol per week  He reports that he does not use illicit drugs.    Medications:   Prescriptions Prior to Admission   Medication Sig Dispense Refill Last Dose   • fluticasone (FLONASE) 50 MCG/ACT nasal spray 2 sprays into each nostril Daily.   12/6/2017   • irbesartan (AVAPRO) 150 MG tablet Take 150 mg by mouth Every Night.   12/7/2017 at Unknown time   • loratadine (CLARITIN) 10 MG tablet Take 10 mg by mouth Daily.   12/6/2017   • rosuvastatin (CRESTOR) 10 MG tablet Take 10 mg by mouth Daily.   12/7/2017 at Unknown time   • pantoprazole (PROTONIX) 40 MG EC tablet Take 1 tablet by mouth Daily. 30 tablet 2 12/7/2017       Allergies:  Zocor [simvastatin]    ROS:    Pertinent items are noted in HPI     Objective     Blood pressure 123/81, pulse 80, temperature 98.5 °F (36.9 °C), temperature source Oral, resp. rate 16, height 167.6 cm (66\"), weight 83.6 kg (184 lb 6.4 oz), SpO2 94 %.    Physical Exam   Constitutional: Pt is oriented to person, place, and time and well-developed, well-nourished, and in no distress.   HENT:   Mouth/Throat: Oropharynx is clear and moist.   Neck: Normal range of motion. Neck supple.   Cardiovascular: Normal rate, " regular rhythm and normal heart sounds.    Pulmonary/Chest: Effort normal and breath sounds normal. No respiratory distress. No  wheezes.   Abdominal: Soft. Bowel sounds are normal.   Skin: Skin is warm and dry.   Psychiatric: Mood, memory, affect and judgment normal.     Assessment/Plan     Diagnosis:  62 y.o. male with dyspepsia and a h/o colon polyps. He last had a colonoscopy in 2/09.        Anticipated Surgical Procedure:  EGD and Colonoscopy    The risks, benefits, and alternatives of this procedure have been discussed with the patient or the responsible party- the patient understands and agrees to proceed.    Nasir Klein M.D.                                                               [Negative] : Heme/Lymph

## 2022-01-28 DIAGNOSIS — I10 PRIMARY HYPERTENSION: ICD-10-CM

## 2022-01-28 RX ORDER — LOSARTAN POTASSIUM 25 MG/1
25 TABLET ORAL DAILY
Qty: 90 TABLET | Refills: 1 | Status: SHIPPED | OUTPATIENT
Start: 2022-01-28 | End: 2022-10-04 | Stop reason: SDUPTHER

## 2022-03-29 RX ORDER — ROSUVASTATIN CALCIUM 10 MG/1
10 TABLET, COATED ORAL DAILY
Qty: 90 TABLET | Refills: 1 | Status: SHIPPED | OUTPATIENT
Start: 2022-03-29 | End: 2022-10-04 | Stop reason: SDUPTHER

## 2022-06-28 RX ORDER — PANTOPRAZOLE SODIUM 40 MG/1
40 TABLET, DELAYED RELEASE ORAL DAILY
Qty: 90 TABLET | Refills: 2 | Status: SHIPPED | OUTPATIENT
Start: 2022-06-28 | End: 2022-10-04 | Stop reason: SDUPTHER

## 2022-09-29 ENCOUNTER — IMMUNIZATION (OUTPATIENT)
Dept: VACCINE CLINIC | Facility: HOSPITAL | Age: 67
End: 2022-09-29

## 2022-09-29 DIAGNOSIS — Z23 NEED FOR VACCINATION: Primary | ICD-10-CM

## 2022-09-29 PROCEDURE — 91312 HC SARSCOV2 VAC 30MCG/0.3ML IM BIVALENT BOOSTER 12 YRS AND OLDER: CPT | Performed by: INTERNAL MEDICINE

## 2022-09-29 PROCEDURE — 0124A: CPT | Performed by: INTERNAL MEDICINE

## 2022-10-04 DIAGNOSIS — I10 PRIMARY HYPERTENSION: ICD-10-CM

## 2022-10-04 RX ORDER — ROSUVASTATIN CALCIUM 10 MG/1
10 TABLET, COATED ORAL DAILY
Qty: 90 TABLET | Refills: 1 | Status: SHIPPED | OUTPATIENT
Start: 2022-10-04

## 2022-10-04 RX ORDER — ROSUVASTATIN CALCIUM 10 MG/1
10 TABLET, COATED ORAL DAILY
Qty: 90 TABLET | Refills: 1 | Status: SHIPPED | OUTPATIENT
Start: 2022-10-04 | End: 2022-10-04 | Stop reason: SDUPTHER

## 2022-10-04 RX ORDER — LOSARTAN POTASSIUM 25 MG/1
25 TABLET ORAL DAILY
Qty: 90 TABLET | Refills: 1 | Status: SHIPPED | OUTPATIENT
Start: 2022-10-04 | End: 2023-03-27 | Stop reason: SDUPTHER

## 2022-10-04 RX ORDER — LOSARTAN POTASSIUM 25 MG/1
25 TABLET ORAL DAILY
Qty: 90 TABLET | Refills: 1 | Status: SHIPPED | OUTPATIENT
Start: 2022-10-04 | End: 2022-10-04 | Stop reason: SDUPTHER

## 2022-10-04 RX ORDER — PANTOPRAZOLE SODIUM 40 MG/1
40 TABLET, DELAYED RELEASE ORAL DAILY
Qty: 90 TABLET | Refills: 2 | Status: SHIPPED | OUTPATIENT
Start: 2022-10-04

## 2022-10-06 DIAGNOSIS — E78.00 HYPERCHOLESTEROLEMIA: Primary | ICD-10-CM

## 2022-10-06 DIAGNOSIS — R73.03 PREDIABETES: ICD-10-CM

## 2022-10-07 LAB
ALBUMIN SERPL-MCNC: 5.3 G/DL (ref 3.5–5.2)
ALBUMIN/GLOB SERPL: 3.3 G/DL
ALP SERPL-CCNC: 75 U/L (ref 39–117)
ALT SERPL-CCNC: 31 U/L (ref 1–41)
AST SERPL-CCNC: 21 U/L (ref 1–40)
BASOPHILS # BLD AUTO: 0.02 10*3/MM3 (ref 0–0.2)
BASOPHILS NFR BLD AUTO: 0.3 % (ref 0–1.5)
BILIRUB SERPL-MCNC: 0.7 MG/DL (ref 0–1.2)
BUN SERPL-MCNC: 16 MG/DL (ref 8–23)
BUN/CREAT SERPL: 15 (ref 7–25)
CALCIUM SERPL-MCNC: 10.2 MG/DL (ref 8.6–10.5)
CHLORIDE SERPL-SCNC: 100 MMOL/L (ref 98–107)
CHOLEST SERPL-MCNC: 192 MG/DL (ref 0–200)
CO2 SERPL-SCNC: 23.9 MMOL/L (ref 22–29)
CREAT SERPL-MCNC: 1.07 MG/DL (ref 0.76–1.27)
EGFRCR SERPLBLD CKD-EPI 2021: 75.6 ML/MIN/1.73
EOSINOPHIL # BLD AUTO: 0.05 10*3/MM3 (ref 0–0.4)
EOSINOPHIL NFR BLD AUTO: 0.9 % (ref 0.3–6.2)
ERYTHROCYTE [DISTWIDTH] IN BLOOD BY AUTOMATED COUNT: 12.3 % (ref 12.3–15.4)
EST. AVERAGE GLUCOSE BLD GHB EST-MCNC: 108.28 MG/DL
FT4I SERPL CALC-MCNC: 2 (ref 1.2–4.9)
GLOBULIN SER CALC-MCNC: 1.6 GM/DL
GLUCOSE SERPL-MCNC: 97 MG/DL (ref 65–99)
HBA1C MFR BLD: 5.4 % (ref 4.8–5.6)
HBV SURFACE AG SERPL QL IA: NEGATIVE
HCT VFR BLD AUTO: 46.6 % (ref 37.5–51)
HCV AB S/CO SERPL IA: <0.1 S/CO RATIO (ref 0–0.9)
HDLC SERPL-MCNC: 71 MG/DL (ref 40–60)
HGB BLD-MCNC: 16.2 G/DL (ref 13–17.7)
HIV 1+2 AB+HIV1 P24 AG SERPL QL IA: NON REACTIVE
IMM GRANULOCYTES # BLD AUTO: 0.01 10*3/MM3 (ref 0–0.05)
IMM GRANULOCYTES NFR BLD AUTO: 0.2 % (ref 0–0.5)
LDLC SERPL CALC-MCNC: 110 MG/DL (ref 0–100)
LYMPHOCYTES # BLD AUTO: 2.31 10*3/MM3 (ref 0.7–3.1)
LYMPHOCYTES NFR BLD AUTO: 39.9 % (ref 19.6–45.3)
MCH RBC QN AUTO: 31.5 PG (ref 26.6–33)
MCHC RBC AUTO-ENTMCNC: 34.8 G/DL (ref 31.5–35.7)
MCV RBC AUTO: 90.5 FL (ref 79–97)
MONOCYTES # BLD AUTO: 0.3 10*3/MM3 (ref 0.1–0.9)
MONOCYTES NFR BLD AUTO: 5.2 % (ref 5–12)
NEUTROPHILS # BLD AUTO: 3.1 10*3/MM3 (ref 1.7–7)
NEUTROPHILS NFR BLD AUTO: 53.5 % (ref 42.7–76)
NRBC BLD AUTO-RTO: 0 /100 WBC (ref 0–0.2)
PLATELET # BLD AUTO: 166 10*3/MM3 (ref 140–450)
POTASSIUM SERPL-SCNC: 5.1 MMOL/L (ref 3.5–5.2)
PROT SERPL-MCNC: 6.9 G/DL (ref 6–8.5)
RBC # BLD AUTO: 5.15 10*6/MM3 (ref 4.14–5.8)
SODIUM SERPL-SCNC: 139 MMOL/L (ref 136–145)
T3RU NFR SERPL: 27 % (ref 24–39)
T4 SERPL-MCNC: 7.3 UG/DL (ref 4.5–12)
TRIGL SERPL-MCNC: 60 MG/DL (ref 0–150)
TSH SERPL DL<=0.005 MIU/L-ACNC: 1.2 UIU/ML (ref 0.45–4.5)
VLDLC SERPL CALC-MCNC: 11 MG/DL (ref 5–40)
WBC # BLD AUTO: 5.79 10*3/MM3 (ref 3.4–10.8)

## 2023-03-20 DIAGNOSIS — Z12.5 SPECIAL SCREENING FOR MALIGNANT NEOPLASM OF PROSTATE: ICD-10-CM

## 2023-03-20 DIAGNOSIS — Z00.00 ANNUAL PHYSICAL EXAM: ICD-10-CM

## 2023-03-20 DIAGNOSIS — Z00.00 ANNUAL PHYSICAL EXAM: Primary | ICD-10-CM

## 2023-03-21 LAB
BASOPHILS # BLD AUTO: 0 X10E3/UL (ref 0–0.2)
BASOPHILS NFR BLD AUTO: 0 %
EOSINOPHIL # BLD AUTO: 0.1 X10E3/UL (ref 0–0.4)
EOSINOPHIL NFR BLD AUTO: 1 %
ERYTHROCYTE [DISTWIDTH] IN BLOOD BY AUTOMATED COUNT: 12.3 % (ref 11.6–15.4)
HCT VFR BLD AUTO: 45.2 % (ref 37.5–51)
HGB BLD-MCNC: 15.3 G/DL (ref 13–17.7)
IMM GRANULOCYTES # BLD AUTO: 0 X10E3/UL (ref 0–0.1)
IMM GRANULOCYTES NFR BLD AUTO: 0 %
LYMPHOCYTES # BLD AUTO: 3.6 X10E3/UL (ref 0.7–3.1)
LYMPHOCYTES NFR BLD AUTO: 52 %
MCH RBC QN AUTO: 31.3 PG (ref 26.6–33)
MCHC RBC AUTO-ENTMCNC: 33.8 G/DL (ref 31.5–35.7)
MCV RBC AUTO: 92 FL (ref 79–97)
MONOCYTES # BLD AUTO: 0.5 X10E3/UL (ref 0.1–0.9)
MONOCYTES NFR BLD AUTO: 6 %
NEUTROPHILS # BLD AUTO: 3 X10E3/UL (ref 1.4–7)
NEUTROPHILS NFR BLD AUTO: 41 %
PLATELET # BLD AUTO: 152 X10E3/UL (ref 150–450)
PSA SERPL-MCNC: 0.2 NG/ML (ref 0–4)
RBC # BLD AUTO: 4.89 X10E6/UL (ref 4.14–5.8)
WBC # BLD AUTO: 7.2 X10E3/UL (ref 3.4–10.8)

## 2023-03-27 DIAGNOSIS — I10 PRIMARY HYPERTENSION: ICD-10-CM

## 2023-03-27 RX ORDER — LOSARTAN POTASSIUM 25 MG/1
25 TABLET ORAL DAILY
Qty: 90 TABLET | Refills: 1 | Status: SHIPPED | OUTPATIENT
Start: 2023-03-27

## 2023-04-11 RX ORDER — ROSUVASTATIN CALCIUM 10 MG/1
10 TABLET, COATED ORAL DAILY
Qty: 90 TABLET | Refills: 1 | Status: SHIPPED | OUTPATIENT
Start: 2023-04-11

## 2023-04-14 ENCOUNTER — OFFICE VISIT (OUTPATIENT)
Dept: FAMILY MEDICINE CLINIC | Facility: CLINIC | Age: 68
End: 2023-04-14
Payer: COMMERCIAL

## 2023-04-14 VITALS
WEIGHT: 168.5 LBS | DIASTOLIC BLOOD PRESSURE: 76 MMHG | BODY MASS INDEX: 27.08 KG/M2 | HEIGHT: 66 IN | HEART RATE: 67 BPM | OXYGEN SATURATION: 98 % | SYSTOLIC BLOOD PRESSURE: 132 MMHG | TEMPERATURE: 96.9 F

## 2023-04-14 DIAGNOSIS — R93.1 ABNORMAL SCREENING CARDIAC CT: ICD-10-CM

## 2023-04-14 DIAGNOSIS — Z00.00 PHYSICAL EXAM, ANNUAL: Primary | ICD-10-CM

## 2023-04-14 DIAGNOSIS — R22.9 SUBCUTANEOUS NODULES, GENERALIZED: ICD-10-CM

## 2023-04-14 PROCEDURE — 99397 PER PM REEVAL EST PAT 65+ YR: CPT | Performed by: INTERNAL MEDICINE

## 2023-04-14 NOTE — PROGRESS NOTES
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  Patient diet physical activity discussed this visit.  Patient is getting a calcium score with ultrasound of the parotid gland.  Parotid gland is slightly swollen.  Patient is also going to [place] vascular work-up.    Objective   Vital Signs:  Blood Pressure 132/76   Pulse 67   Temperature 96.9 °F (36.1 °C)   Height 167.6 cm (66\")   Weight 76.4 kg (168 lb 8 oz)   Oxygen Saturation 98%   Body Mass Index 27.20 kg/m²   Estimated body mass index is 27.2 kg/m² as calculated from the following:    Height as of this encounter: 167.6 cm (66\").    Weight as of this encounter: 76.4 kg (168 lb 8 oz).             Physical Exam  Vitals and nursing note reviewed.   Constitutional:       General: He is not in acute distress.     Appearance: Normal appearance. He is well-developed. He is not ill-appearing, toxic-appearing or diaphoretic.   HENT:      Head: Normocephalic and atraumatic.      Right Ear: Tympanic membrane, ear canal and external ear normal. There is no impacted cerumen.      Left Ear: Tympanic membrane, ear canal and external ear normal. There is no impacted cerumen.      Nose: Nose normal. No congestion or rhinorrhea.      Mouth/Throat:      Mouth: Mucous membranes are moist.      Pharynx: Oropharynx is clear. No oropharyngeal exudate or posterior oropharyngeal erythema.   Eyes:      General: No scleral icterus.        Right eye: No discharge.         Left eye: No discharge.      Extraocular Movements: Extraocular movements intact.      Conjunctiva/sclera: Conjunctivae normal.      Pupils: Pupils are equal, round, and reactive to light.   Neck:      Thyroid: No thyromegaly.      Vascular: No carotid bruit or JVD.      Trachea: No tracheal deviation.   Cardiovascular:      Rate and Rhythm: Normal rate and regular rhythm.      " Heart sounds: Normal heart sounds. No murmur heard.    No friction rub. No gallop.   Pulmonary:      Effort: Pulmonary effort is normal. No respiratory distress.      Breath sounds: Normal breath sounds. No stridor. No wheezing, rhonchi or rales.   Chest:      Chest wall: No tenderness.   Abdominal:      General: Bowel sounds are normal. There is no distension.      Palpations: Abdomen is soft. There is no mass.      Tenderness: There is no abdominal tenderness. There is no right CVA tenderness, left CVA tenderness, guarding or rebound.      Hernia: No hernia is present.   Musculoskeletal:         General: No swelling, tenderness, deformity or signs of injury. Normal range of motion.      Cervical back: Normal range of motion and neck supple. No rigidity. No muscular tenderness.      Right lower leg: No edema.      Left lower leg: No edema.   Lymphadenopathy:      Cervical: No cervical adenopathy.   Skin:     General: Skin is warm and dry.      Coloration: Skin is not jaundiced or pale.      Findings: No bruising, erythema, lesion or rash.   Neurological:      General: No focal deficit present.      Mental Status: He is alert and oriented to person, place, and time. Mental status is at baseline.      Cranial Nerves: No cranial nerve deficit.      Sensory: No sensory deficit.      Motor: No weakness or abnormal muscle tone.      Coordination: Coordination normal.      Gait: Gait normal.      Deep Tendon Reflexes: Reflexes normal.   Psychiatric:         Mood and Affect: Mood normal.         Behavior: Behavior normal.         Thought Content: Thought content normal.         Judgment: Judgment normal.        Result Review :                   Assessment and Plan  #1 physical #2 calcium score/vascular screening/ultrasound parotid gland  Diagnoses and all orders for this visit:    1. Physical exam, annual (Primary)    2. Abnormal screening cardiac CT  -     CT Cardiac Calcium Score Without Dye; Future    3. Subcutaneous  nodules, generalized  -     US Head Neck Soft Tissue; Future             Follow Up   Return in about 1 year (around 4/14/2024), or if symptoms worsen or fail to improve, for Annual physical.  Patient was given instructions and counseling regarding his condition or for health maintenance advice. Please see specific information pulled into the AVS if appropriate.

## 2023-04-18 ENCOUNTER — TELEPHONE (OUTPATIENT)
Dept: GASTROENTEROLOGY | Facility: CLINIC | Age: 68
End: 2023-04-18
Payer: COMMERCIAL

## 2023-04-18 NOTE — TELEPHONE ENCOUNTER
CALLER: Beto Bassett    RELATIONSHIP TO PATIENT: Self    BEST CALL BACK NUMBER: [phone]    CALL REGARDING: Colonoscopy/EGD Screening  Informed that I am sending message over to Ms. Hernandez for colonoscopy screening and once reviewed by doctor then the colonoscopy can be scheduled.

## 2023-04-20 ENCOUNTER — PRE-PROCEDURE SCREENING (OUTPATIENT)
Dept: GASTROENTEROLOGY | Facility: CLINIC | Age: 68
End: 2023-04-20
Payer: COMMERCIAL

## 2023-04-21 ENCOUNTER — TELEPHONE (OUTPATIENT)
Dept: GASTROENTEROLOGY | Facility: CLINIC | Age: 68
End: 2023-04-21

## 2023-04-21 NOTE — TELEPHONE ENCOUNTER
PT IS RETURNING A CALL FROM [employer] REGARDING HIS OA.  HE WANTS TO BE CALLED ON HIS CELL PHONE.

## 2023-04-24 NOTE — TELEPHONE ENCOUNTER
LAST SCOPE 2017 IN Epic --Personal history of polyps--No family history of polyps--Family history of colon cancer--No blood thinners--Medications:          rosuvastatin (CRESTOR) 10 MG tablet  losartan (Cozaar) 25 MG tablet  pantoprazole (PROTONIX) 40 MG EC tablet            QUESTIONNAIRE SCREENING  SCAN IN  MEDIA & HAS BEEN SENT TO DOCTOR FOR REVIEW

## 2023-04-28 ENCOUNTER — HOSPITAL ENCOUNTER (OUTPATIENT)
Dept: CT IMAGING | Facility: HOSPITAL | Age: 68
Discharge: HOME | End: 2023-04-28

## 2023-04-28 ENCOUNTER — HOSPITAL ENCOUNTER (OUTPATIENT)
Dept: ULTRASOUND IMAGING | Facility: HOSPITAL | Age: 68
Discharge: HOME | End: 2023-04-28

## 2023-04-28 DIAGNOSIS — R93.1 ABNORMAL SCREENING CARDIAC CT: ICD-10-CM

## 2023-04-28 DIAGNOSIS — R22.9 SUBCUTANEOUS NODULES, GENERALIZED: ICD-10-CM

## 2023-04-28 PROCEDURE — 75571 CT HRT W/O DYE W/CA TEST: CPT

## 2023-04-28 PROCEDURE — 76536 US EXAM OF HEAD AND NECK: CPT

## 2023-05-01 ENCOUNTER — PREP FOR SURGERY (OUTPATIENT)
Dept: OTHER | Facility: HOSPITAL | Age: 68
End: 2023-05-01
Payer: COMMERCIAL

## 2023-05-01 DIAGNOSIS — K63.5 COLON POLYP: Primary | ICD-10-CM

## 2023-05-10 ENCOUNTER — TELEPHONE (OUTPATIENT)
Dept: GASTROENTEROLOGY | Facility: CLINIC | Age: 68
End: 2023-05-10

## 2023-05-10 NOTE — TELEPHONE ENCOUNTER
Caller: Beto Bassett Dr.    Relationship: Self    Best call back number: [phone]    What is the best time to reach you: ANY    Who are you requesting to speak with (clinical staff, provider,  specific staff member): SHILPI    What was the call regarding: SCHEDULE GASTRO PROCEDURES    Do you require a callback: YES

## 2023-05-18 ENCOUNTER — TELEPHONE (OUTPATIENT)
Dept: GASTROENTEROLOGY | Facility: CLINIC | Age: 68
End: 2023-05-18
Payer: COMMERCIAL

## 2023-05-18 ENCOUNTER — PREP FOR SURGERY (OUTPATIENT)
Dept: OTHER | Facility: HOSPITAL | Age: 68
End: 2023-05-18
Payer: COMMERCIAL

## 2023-05-19 ENCOUNTER — TELEPHONE (OUTPATIENT)
Dept: GASTROENTEROLOGY | Facility: CLINIC | Age: 68
End: 2023-05-19
Payer: COMMERCIAL

## 2023-05-19 ENCOUNTER — OFFICE VISIT (OUTPATIENT)
Dept: CARDIOLOGY | Facility: CLINIC | Age: 68
End: 2023-05-19
Payer: COMMERCIAL

## 2023-05-19 VITALS
BODY MASS INDEX: 27.06 KG/M2 | HEART RATE: 64 BPM | HEIGHT: 66 IN | WEIGHT: 168.4 LBS | SYSTOLIC BLOOD PRESSURE: 128 MMHG | DIASTOLIC BLOOD PRESSURE: 80 MMHG

## 2023-05-19 DIAGNOSIS — E78.00 PURE HYPERCHOLESTEROLEMIA: ICD-10-CM

## 2023-05-19 DIAGNOSIS — Z82.49 FAMILY HISTORY OF AORTIC ANEURYSM: ICD-10-CM

## 2023-05-19 DIAGNOSIS — I10 PRIMARY HYPERTENSION: ICD-10-CM

## 2023-05-19 DIAGNOSIS — Z82.49 FAMILY HISTORY OF ABDOMINAL AORTIC ANEURYSM (AAA): ICD-10-CM

## 2023-05-19 DIAGNOSIS — I25.709 CORONARY ARTERY DISEASE INVOLVING CORONARY BYPASS GRAFT OF NATIVE HEART WITH ANGINA PECTORIS: Primary | ICD-10-CM

## 2023-05-19 PROBLEM — K63.5 COLON POLYP: Status: ACTIVE | Noted: 2023-05-19

## 2023-05-19 PROCEDURE — 93000 ELECTROCARDIOGRAM COMPLETE: CPT | Performed by: INTERNAL MEDICINE

## 2023-05-19 PROCEDURE — 99204 OFFICE O/P NEW MOD 45 MIN: CPT | Performed by: INTERNAL MEDICINE

## 2023-05-19 NOTE — PROGRESS NOTES
Date of Office Visit: 2023  Encounter Provider: Jamee Dinero MD  Place of Service: [facility] CARDIOLOGY  Patient Name: Beto Bassett Dr.  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    He does not exercise.  He denies any chest pain, shortness of breath, palpitations, syncope near syncope.  On further questioning his wife has noticed that he has had more easy fatigability in the past several months.    Of note he has a long history of dyslipidemia since age 29 treated usually Zocor but then developed hives and on Crestor without difficulty.  Has headaches 1 he was off antihypertensives otherwise.  He does have a father who had aneurysmal/aortic pathology.  Sounds as if he may have had aneurysm repair.    Blood work 10/2022 shows normal CMP thyroid studies unremarkable, , HDL 71, triglycerides 60, CBC unremarkable 3/2023    Past Medical History:   Diagnosis Date   • Cordon esophagus    • Elevated coronary artery calcium score    • GERD (gastroesophageal reflux disease)    • Hyperlipemia    • Hypertension    • Seasonal allergies      Past Surgical History:   Procedure Laterality Date   • COLONOSCOPY N/A 2017    Procedure: COLONOSCOPY TO CECUM AND INTO TERMINAL ILEUM WITH COLD BIOPSY POLYPECTOMY;  Surgeon: Nasir Klein MD;  Location: [facility] ENDOSCOPY;  Service:    • COLONOSCOPY W/ BIOPSIES  2009    Dr. Lew; 3-4 mm tubular adenoma   • ENDOSCOPY N/A 2017    Procedure: ESOPHAGOGASTRODUODENOSCOPY WITH COLD BIOPSIES;  Surgeon: Nasir Klein MD;  Location: [facility] ENDOSCOPY;  Service:    • TONSILLECTOMY        Outpatient Medications Prior to Visit   Medication Sig Dispense Refill   • losartan (Cozaar) 25 MG tablet Take 1 tablet by mouth Daily. 90 tablet 1   • Multiple Vitamins-Minerals (CENTRUM SILVER 50+MEN PO) Take  by mouth.     • pantoprazole (PROTONIX) 40 MG EC tablet Take 1 tablet by mouth Daily. 90 tablet 2   • rosuvastatin (CRESTOR) 10 MG tablet Take 1 tablet by mouth Daily. 90 tablet 1   • hepatitis A (HAVRIX) 1440 EL U/ML vaccine Inject  into the shoulder, thigh, or buttocks. 1 mL 0   • hepatitis A (HAVRIX) 1440 EL U/ML vaccine Inject  into the appropriate muscle as directed by prescriber. 1 mL 0   • pneumococcal polysaccharide 23-valent (PNEUMOVAX-23) 25 MCG/0.5ML vaccine Inject  into the appropriate muscle as directed by prescriber. 0.5 mL 0   • Zoster Vac Recomb Adjuvanted (SHINGRIX) 50 MCG/0.5ML reconstituted suspension Inject  into the appropriate muscle as directed by prescriber. 1 each 0     No facility-administered medications prior to visit.       Allergies as of 05/19/2023 - Reviewed 05/19/2023   Allergen Reaction Noted   • Zocor [simvastatin] Hives 10/27/2017     Social History     Socioeconomic History   • Marital status:    Tobacco Use   • Smoking status: Never   • Smokeless tobacco: Never   • Tobacco comments:     Caffeine use   Substance and Sexual Activity   • Alcohol use: Yes     Alcohol/week: 7.0 standard drinks     Types: 7 Standard drinks or equivalent per week     Comment: occas   • Drug use: No   • Sexual activity: Defer     Family History   Problem Relation Age of Onset   • Diabetes Mother         from liver transplant   • Lymphoma Mother         from liver transplant   • Hypertension Father    • Prostate cancer Father    • Stroke Father    • No Known Problems Sister    • No Known Problems Brother    • Heart disease Maternal Grandmother    • Heart disease Maternal Grandfather    • Heart disease Paternal Grandmother    • Heart disease Paternal Grandfather    • Heart disease Other  "     Review of Systems   Constitutional: Positive for malaise/fatigue. Negative for chills, fever, weight gain and weight loss.   Cardiovascular: Negative for leg swelling.   Respiratory: Negative for cough, snoring and wheezing.    Hematologic/Lymphatic: Negative for bleeding problem. Does not bruise/bleed easily.   Skin: Negative for color change.   Musculoskeletal: Negative for falls, joint pain and myalgias.   Gastrointestinal: Negative for melena.   Genitourinary: Negative for hematuria.   Neurological: Negative for excessive daytime sleepiness.   Psychiatric/Behavioral: Negative for depression. The patient is not nervous/anxious.         Objective:     Vitals:    05/19/23 0755   BP: 128/80   BP Location: Left arm   Patient Position: Sitting   Pulse: 64   Weight: 76.4 kg (168 lb 6.4 oz)   Height: 167.6 cm (66\")     Body mass index is 27.18 kg/m².    Vitals reviewed.   Constitutional:       Appearance: Well-developed.   Eyes:      General: No scleral icterus.        Right eye: No discharge.      Conjunctiva/sclera: Conjunctivae normal.      Pupils: Pupils are equal, round, and reactive to light.   HENT:      Head: Normocephalic.      Nose: Nose normal.   Neck:      Thyroid: No thyromegaly.      Vascular: No JVD.   Pulmonary:      Effort: Pulmonary effort is normal. No respiratory distress.      Breath sounds: Normal breath sounds. No wheezing. No rales.   Cardiovascular:      Normal rate. Regular rhythm. Normal S1. Normal S2.      Murmurs: There is no murmur.      No gallop.   Pulses:     Popliteal: 2+ on the left side.  Edema:     Peripheral edema absent.   Abdominal:      General: Bowel sounds are normal. There is no distension.      Palpations: Abdomen is soft.      Tenderness: There is no abdominal tenderness. There is no rebound.   Musculoskeletal: Normal range of motion.         General: No tenderness.      Cervical back: Normal range of motion and neck supple. Skin:     General: Skin is warm and dry.      " Findings: No erythema or rash.   Neurological:      Mental Status: Alert and oriented to person, place, and time.   Psychiatric:         Behavior: Behavior normal.         Thought Content: Thought content normal.         Judgment: Judgment normal.       Lab Review:         ECG 12 Lead    Date/Time: 5/19/2023 8:40 AM  Performed by: Jamee Dinero MD  Authorized by: Jamee Dinero MD   Previous ECG: no previous ECG available  Rhythm: sinus rhythm  Other findings comments: Minimal ST elevation anterior leads    Clinical impression: abnormal EKG          Assessment:       Diagnosis Plan   1. 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 Family history of abdominal aortic aneurysm (AAA)        3. Family history of aortic aneurysm  CT Angiogram Chest      4. Primary hypertension        5. Pure hypercholesterolemia          Plan:       1.  Coronary artery disease with high coronary artery calcium Agatston score involving RCA and circumflex predominantly.  I recommended he add enteric-coated aspirin 81 mg a day.  Given recent last blood work and the goal LDL less than 70, would consider increasing statin therapy.  We will discuss this further with Dr. Cuevas.  Blood pressure control appears reasonable.  He will proceed on with stress echocardiogram to assess for significant obstructive disease especially given the heavy calcium load.  He is otherwise asymptomatic and would defer cath for now.    2.  Hypertension.  Appears controlled  3.  Hyperlipidemia.  As above.  In addition I recommended he consider nonstatin every 10.  4.  Family history of possible aortic aneurysmal disease.  We will check CT angiogram of the chest.  5.  Cordon's esophagus      Time Spent: I spent 50 minutes caring for Beto on this date of service. This time includes time spent by me in the following activities:  preparing for the visit, reviewing tests, obtaining and/or reviewing a separately obtained history, performing a medically appropriate examination and/or evaluation, counseling and educating the patient/family/caregiver, ordering medications, tests, or procedures, documenting information in the medical record and independently interpreting results and communicating that information with the patient/family/caregiver.   I spent 1 minutes on the separately reported service of ECG. This time is not included in the time used to support the E/M service also reported today.        Your medication list          Accurate as of May 19, 2023 11:59 PM. If you have any questions, ask your nurse or doctor.            CHANGE how you take these medications      Instructions Last Dose Given Next Dose Due   rosuvastatin 10 MG tablet  Commonly known as: CRESTOR  What changed: Another medication with the same name was added. Make sure you understand how and when to take each.      Take 1 tablet by mouth Daily.       rosuvastatin 20 MG tablet  Commonly known as: CRESTOR  What changed: You were already taking a medication with the same name, and this prescription was added. Make sure you understand how and when to take each.      Take 1 tablet by mouth Daily.          CONTINUE taking these medications      Instructions Last Dose Given Next Dose Due   Havrix 1440 EL U/ML vaccine  Generic drug: hepatitis A      Inject  into the shoulder, thigh, or buttocks.       Havrix 1440 EL U/ML vaccine  Generic drug: hepatitis A      Inject  into the appropriate muscle as directed by prescriber.       losartan 25 MG tablet  Commonly known as: Cozaar      Take 1 tablet by mouth Daily.       multivitamin with minerals tablet tablet      Take  by mouth.       pantoprazole 40 MG EC tablet  Commonly known as: PROTONIX      Take 1 tablet by mouth Daily.       Pneumovax 23 25 MCG/0.5ML vaccine  Generic drug: pneumococcal polysaccharide 23-valent      Inject   into the appropriate muscle as directed by prescriber.       Shingrix 50 MCG/0.5ML reconstituted suspension  Generic drug: Zoster Vac Recomb Adjuvanted      Inject  into the appropriate muscle as directed by prescriber.             Where to Get Your Medications      These medications were sent to [place] [id number]    Hours: 7:00 AM-6:00 PM Mon-Fri, 8:00 AM-4:30 PM Sat-Sun (Closed 12-12:30PM) Phone: [phone]   · rosuvastatin 20 MG tablet         Patient is no longer taking -.  I corrected the med list to reflect this.  I did not stop these medications.      Dictated utilizing Dragon dictation

## 2023-05-19 NOTE — TELEPHONE ENCOUNTER
Hub staff attempted to follow warm transfer process and was unsuccessful     Caller: Beto Bassett Dr.    Relationship to patient: Self    Best call back number: [phone]    Patient is needing: PT RETURNING CALL. PT CONFIRMED THAT HE CAN DO 7/8/23 SCOPE. PT WANTED OFFICE TO KNOW.

## 2023-05-19 NOTE — TELEPHONE ENCOUNTER
RAFA patient via telephone for. Scheduled COLONOSCOPY 7-8-2023 with arrival time of 8AM. Prep paperwork mailed to verified address on file. Patient advised arrival time may change based on [facility] guidelines. RAFA ARREDONDO

## 2023-06-02 ENCOUNTER — HOSPITAL ENCOUNTER (OUTPATIENT)
Dept: CT IMAGING | Facility: HOSPITAL | Age: 68
Discharge: HOME | End: 2023-06-02
Payer: COMMERCIAL

## 2023-06-02 ENCOUNTER — TELEPHONE (OUTPATIENT)
Dept: CARDIOLOGY | Facility: CLINIC | Age: 68
End: 2023-06-02

## 2023-06-02 LAB — CREAT BLDA-MCNC: 1 MG/DL (ref 0.6–1.3)

## 2023-06-02 PROCEDURE — 71275 CT ANGIOGRAPHY CHEST: CPT

## 2023-06-02 PROCEDURE — 82565 ASSAY OF CREATININE: CPT

## 2023-06-02 PROCEDURE — 25510000001 IOPAMIDOL PER 1 ML: Performed by: INTERNAL MEDICINE

## 2023-06-02 RX ADMIN — IOPAMIDOL 95 ML: 755 INJECTION, SOLUTION INTRAVENOUS at 09:29

## 2023-06-02 NOTE — TELEPHONE ENCOUNTER
Please let him know that CTA showed normal aortic size. There were tiny pulmonary nodules seen incidentally seen, and should be followed by his PCP. Will follow up with results of his upcoming stress echo when they are available.

## 2023-06-02 NOTE — TELEPHONE ENCOUNTER
Notified patient of results/recommendations. Patient verbalized understanding.    Lizzy Hendricks RN  [place] [facility] [place]

## 2023-06-09 ENCOUNTER — HOSPITAL ENCOUNTER (OUTPATIENT)
Dept: CARDIOLOGY | Facility: HOSPITAL | Age: 68
Discharge: HOME | End: 2023-06-09
Payer: COMMERCIAL

## 2023-06-09 VITALS
HEART RATE: 70 BPM | HEIGHT: 66 IN | BODY MASS INDEX: 27.07 KG/M2 | DIASTOLIC BLOOD PRESSURE: 66 MMHG | WEIGHT: 168.43 LBS | SYSTOLIC BLOOD PRESSURE: 126 MMHG

## 2023-06-09 DIAGNOSIS — I25.709 CORONARY ARTERY DISEASE INVOLVING CORONARY BYPASS GRAFT OF NATIVE HEART WITH ANGINA PECTORIS: ICD-10-CM

## 2023-06-09 LAB
BH CV ECHO MEAS - ACS: 2.17 CM
BH CV ECHO MEAS - AI P1/2T: 808.8 MSEC
BH CV ECHO MEAS - AO MAX PG: 5.8 MMHG
BH CV ECHO MEAS - AO MEAN PG: 2.9 MMHG
BH CV ECHO MEAS - AO ROOT DIAM: 3.3 CM
BH CV ECHO MEAS - AO V2 MAX: 120.8 CM/SEC
BH CV ECHO MEAS - AO V2 VTI: 26.5 CM
BH CV ECHO MEAS - AVA(I,D): 3.6 CM2
BH CV ECHO MEAS - EDV(CUBED): 122.4 ML
BH CV ECHO MEAS - EDV(MOD-SP2): 167 ML
BH CV ECHO MEAS - EDV(MOD-SP4): 142 ML
BH CV ECHO MEAS - EF(MOD-BP): 56.7 %
BH CV ECHO MEAS - EF(MOD-SP2): 56.9 %
BH CV ECHO MEAS - EF(MOD-SP4): 59.9 %
BH CV ECHO MEAS - ESV(CUBED): 31.4 ML
BH CV ECHO MEAS - ESV(MOD-SP2): 72 ML
BH CV ECHO MEAS - ESV(MOD-SP4): 57 ML
BH CV ECHO MEAS - FS: 36.5 %
BH CV ECHO MEAS - IVS/LVPW: 1.03 CM
BH CV ECHO MEAS - IVSD: 0.96 CM
BH CV ECHO MEAS - LAT PEAK E' VEL: 12.4 CM/SEC
BH CV ECHO MEAS - LV DIASTOLIC VOL/BSA (35-75): 76.3 CM2
BH CV ECHO MEAS - LV MASS(C)D: 167.5 GRAMS
BH CV ECHO MEAS - LV MAX PG: 5 MMHG
BH CV ECHO MEAS - LV MEAN PG: 2.31 MMHG
BH CV ECHO MEAS - LV SYSTOLIC VOL/BSA (12-30): 30.6 CM2
BH CV ECHO MEAS - LV V1 MAX: 111.6 CM/SEC
BH CV ECHO MEAS - LV V1 VTI: 25.5 CM
BH CV ECHO MEAS - LVIDD: 5 CM
BH CV ECHO MEAS - LVIDS: 3.2 CM
BH CV ECHO MEAS - LVOT AREA: 3.7 CM2
BH CV ECHO MEAS - LVOT DIAM: 2.16 CM
BH CV ECHO MEAS - LVPWD: 0.94 CM
BH CV ECHO MEAS - MED PEAK E' VEL: 8.8 CM/SEC
BH CV ECHO MEAS - MV A DUR: 0.18 SEC
BH CV ECHO MEAS - MV A MAX VEL: 43.7 CM/SEC
BH CV ECHO MEAS - MV DEC SLOPE: 291.8 CM/SEC2
BH CV ECHO MEAS - MV DEC TIME: 0.24 MSEC
BH CV ECHO MEAS - MV E MAX VEL: 43.3 CM/SEC
BH CV ECHO MEAS - MV E/A: 0.99
BH CV ECHO MEAS - MV MAX PG: 2.7 MMHG
BH CV ECHO MEAS - MV MEAN PG: 0.99 MMHG
BH CV ECHO MEAS - MV P1/2T: 74.3 MSEC
BH CV ECHO MEAS - MV V2 VTI: 22.8 CM
BH CV ECHO MEAS - MVA(P1/2T): 3 CM2
BH CV ECHO MEAS - MVA(VTI): 4.1 CM2
BH CV ECHO MEAS - PA ACC TIME: 0.13 SEC
BH CV ECHO MEAS - PA V2 MAX: 69 CM/SEC
BH CV ECHO MEAS - PULM A REVS DUR: 0.17 SEC
BH CV ECHO MEAS - PULM A REVS VEL: 30 CM/SEC
BH CV ECHO MEAS - PULM DIAS VEL: 35.7 CM/SEC
BH CV ECHO MEAS - PULM S/D: 1.88
BH CV ECHO MEAS - PULM SYS VEL: 67.1 CM/SEC
BH CV ECHO MEAS - RV MAX PG: 0.71 MMHG
BH CV ECHO MEAS - RV V1 MAX: 42 CM/SEC
BH CV ECHO MEAS - RV V1 VTI: 10 CM
BH CV ECHO MEAS - SI(MOD-SP2): 51 ML/M2
BH CV ECHO MEAS - SI(MOD-SP4): 45.6 ML/M2
BH CV ECHO MEAS - SV(LVOT): 94 ML
BH CV ECHO MEAS - SV(MOD-SP2): 95 ML
BH CV ECHO MEAS - SV(MOD-SP4): 85 ML
BH CV ECHO MEAS - TAPSE (>1.6): 2.3 CM
BH CV ECHO MEAS - TR MAX PG: 21 MMHG
BH CV ECHO MEAS - TR MAX VEL: 229.1 CM/SEC
BH CV ECHO MEASUREMENTS AVERAGE E/E' RATIO: 4.08
BH CV ECHO SHUNT ASSESSMENT PERFORMED (HIDDEN SCRIPTING): 1
BH CV STRESS BP STAGE 1: NORMAL
BH CV STRESS BP STAGE 2: NORMAL
BH CV STRESS BP STAGE 3: NORMAL
BH CV STRESS BP STAGE 4: NORMAL
BH CV STRESS DURATION MIN STAGE 1: 3
BH CV STRESS DURATION MIN STAGE 2: 3
BH CV STRESS DURATION MIN STAGE 3: 3
BH CV STRESS DURATION MIN STAGE 4: 3
BH CV STRESS DURATION SEC STAGE 1: 0
BH CV STRESS DURATION SEC STAGE 2: 0
BH CV STRESS DURATION SEC STAGE 3: 0
BH CV STRESS DURATION SEC STAGE 4: 0
BH CV STRESS ECHO POST STRESS EJECTION FRACTION EF: 72 %
BH CV STRESS GRADE STAGE 1: 10
BH CV STRESS GRADE STAGE 2: 12
BH CV STRESS GRADE STAGE 3: 14
BH CV STRESS GRADE STAGE 4: 16
BH CV STRESS HR STAGE 1: 101
BH CV STRESS HR STAGE 2: 115
BH CV STRESS HR STAGE 3: 119
BH CV STRESS HR STAGE 4: 136
BH CV STRESS METS STAGE 1: 5
BH CV STRESS METS STAGE 2: 7.5
BH CV STRESS METS STAGE 3: 10
BH CV STRESS METS STAGE 4: 13.5
BH CV STRESS PROTOCOL 1: NORMAL
BH CV STRESS SPEED STAGE 1: 1.7
BH CV STRESS SPEED STAGE 2: 2.5
BH CV STRESS SPEED STAGE 3: 3.4
BH CV STRESS SPEED STAGE 4: 4.2
BH CV STRESS STAGE 1: 1
BH CV STRESS STAGE 2: 2
BH CV STRESS STAGE 3: 3
BH CV STRESS STAGE 4: 4
BH CV XLRA - RV BASE: 3.9 CM
BH CV XLRA - RV LENGTH: 7.5 CM
BH CV XLRA - RV MID: 3.5 CM
BH CV XLRA - TDI S': 16.1 CM/SEC
LEFT ATRIUM VOLUME INDEX: 30.8 ML/M2
MAXIMAL PREDICTED HEART RATE: 151 BPM
PERCENT MAX PREDICTED HR: 90.07 %
SINUS: 3.6 CM
STJ: 2.6 CM
STRESS BASELINE BP: NORMAL MMHG
STRESS BASELINE HR: 60 BPM
STRESS PERCENT HR: 106 %
STRESS POST ESTIMATED WORKLOAD: 13.5 METS
STRESS POST EXERCISE DUR MIN: 12 MIN
STRESS POST EXERCISE DUR SEC: 0 SEC
STRESS POST PEAK BP: NORMAL MMHG
STRESS POST PEAK HR: 136 BPM
STRESS TARGET HR: 128 BPM

## 2023-06-09 PROCEDURE — 93325 DOPPLER ECHO COLOR FLOW MAPG: CPT

## 2023-06-09 PROCEDURE — 93350 STRESS TTE ONLY: CPT

## 2023-06-09 PROCEDURE — 25510000001 PERFLUTREN (DEFINITY) 8.476 MG IN SODIUM CHLORIDE (PF) 0.9 % 10 ML INJECTION: Performed by: INTERNAL MEDICINE

## 2023-06-09 PROCEDURE — 93320 DOPPLER ECHO COMPLETE: CPT

## 2023-06-09 PROCEDURE — 93017 CV STRESS TEST TRACING ONLY: CPT

## 2023-06-09 RX ADMIN — PERFLUTREN 2 ML: 6.52 INJECTION, SUSPENSION INTRAVENOUS at 10:44

## 2023-06-12 ENCOUNTER — TELEPHONE (OUTPATIENT)
Dept: CARDIOLOGY | Facility: CLINIC | Age: 68
End: 2023-06-12
Payer: COMMERCIAL

## 2023-06-12 NOTE — TELEPHONE ENCOUNTER
Please let him know that stress echo looked good.  His heart is strong and functioning well with no evidence of PFO or ASD.  Stress echocardiogram showed no evidence of ischemia or tight blockage in the heart at this time.  Would continue current medications and call with questions or concerns.

## 2023-06-12 NOTE — TELEPHONE ENCOUNTER
I spoke with Beto Bassett Dr. and updated pt on recommendations from provider.  Pt verbalized understanding and has no further questions at this time.    Thank you,    Roslyn Porter, RN  [place] [facility] [place]  06/12/23 12:36 EDT

## 2023-06-12 NOTE — TELEPHONE ENCOUNTER
The sclerosis is expected for him, with the degree of coronary calcification noted on his CT scan. The valve had mild regurgitation, but no stenosis (narrowing). This will be monitored by standard echocardiogram, usually on an annual or biannual basis. We would probably repeat a stress echo in about 3 years to evaluate for ischemia, or earlier if there were new symptoms.

## 2023-06-12 NOTE — TELEPHONE ENCOUNTER
"I spoke with Beto Bassett Dr. and updated pt on results/recommendations from provider.  Pt verbalized understanding.    He was curious if the \"aortic sclerosis\" that was on the report is normal for someone of his age, or if there is any further monitoring for this?  And will he need any routine stress echo's completed in the future?    Thank you,    Roslyn Porter, RN  Triage [facility] [place]  06/12/23 10:54 EDT    "

## 2023-10-25 ENCOUNTER — CLINICAL SUPPORT (OUTPATIENT)
Dept: OBSTETRICS AND GYNECOLOGY | Facility: CLINIC | Age: 68
End: 2023-10-25
Payer: COMMERCIAL

## 2023-10-25 VITALS — HEIGHT: 66 IN | BODY MASS INDEX: 26.63 KG/M2

## 2023-10-25 DIAGNOSIS — Z23 NEED FOR INFLUENZA VACCINATION: Primary | ICD-10-CM

## 2023-10-25 NOTE — PROGRESS NOTES
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     NDC:69071-846-75  Lot: [id number]  Exp: 6/30/24    Left Deltoid IM

## 2023-11-03 ENCOUNTER — HOSPITAL ENCOUNTER (OUTPATIENT)
Dept: CARDIOLOGY | Facility: HOSPITAL | Age: 68
Discharge: HOME | End: 2023-11-03

## 2023-11-03 VITALS
DIASTOLIC BLOOD PRESSURE: 69 MMHG | WEIGHT: 169 LBS | BODY MASS INDEX: 27.16 KG/M2 | SYSTOLIC BLOOD PRESSURE: 120 MMHG | HEART RATE: 72 BPM | HEIGHT: 66 IN

## 2023-11-03 DIAGNOSIS — Z13.9 SCREENING DUE: ICD-10-CM

## 2023-11-03 LAB
BH CV ECHO MEAS - DIST AO DIAM: 1.62 CM
BH CV VAS BP LEFT ARM: NORMAL MMHG
BH CV VAS BP RIGHT ARM: NORMAL MMHG
BH CV VAS SCREENING CAROTID CCA LEFT: NORMAL CM/SEC
BH CV VAS SCREENING CAROTID CCA RIGHT: NORMAL CM/SEC
BH CV VAS SCREENING CAROTID ICA LEFT: NORMAL CM/SEC
BH CV VAS SCREENING CAROTID ICA RIGHT: NORMAL CM/SEC
BH CV XLRA MEAS - MID AO DIAM: 1.76 CM
BH CV XLRA MEAS - PAD LEFT ABI DP: 1.33
BH CV XLRA MEAS - PAD LEFT ABI PT: 1.38
BH CV XLRA MEAS - PAD LEFT ARM: 122 MMHG
BH CV XLRA MEAS - PAD LEFT LEG DP: 159 MMHG
BH CV XLRA MEAS - PAD LEFT LEG PT: 166 MMHG
BH CV XLRA MEAS - PAD RIGHT ABI DP: 1.3
BH CV XLRA MEAS - PAD RIGHT ABI PT: 1.36
BH CV XLRA MEAS - PAD RIGHT ARM: 114 MMHG
BH CV XLRA MEAS - PAD RIGHT LEG DP: 156 MMHG
BH CV XLRA MEAS - PAD RIGHT LEG PT: 163 MMHG
BH CV XLRA MEAS - PROX AO DIAM: 1.84 CM
BH CV XLRA MEAS LEFT ICA/CCA RATIO: 0.92
BH CV XLRA MEAS LEFT PROX ECA PSV: NORMAL CM/SEC
BH CV XLRA MEAS RIGHT ICA/CCA RATIO: 0.92
BH CV XLRA MEAS RIGHT PROX ECA PSV: NORMAL CM/SEC
MAXIMAL PREDICTED HEART RATE: 151 BPM
STRESS TARGET HR: 128 BPM

## 2023-11-03 PROCEDURE — 93799 UNLISTED CV SVC/PROCEDURE: CPT

## 2023-11-08 DIAGNOSIS — E78.00 HYPERCHOLESTEREMIA: Primary | ICD-10-CM

## 2023-11-09 LAB
CHOLEST SERPL-MCNC: 180 MG/DL (ref 0–200)
HDLC SERPL-MCNC: 66 MG/DL (ref 40–60)
LDLC SERPL CALC-MCNC: 100 MG/DL (ref 0–100)
TRIGL SERPL-MCNC: 73 MG/DL (ref 0–150)
VLDLC SERPL CALC-MCNC: 14 MG/DL (ref 5–40)

## 2023-11-10 ENCOUNTER — OFFICE VISIT (OUTPATIENT)
Dept: CARDIOLOGY | Facility: CLINIC | Age: 68
End: 2023-11-10
Payer: COMMERCIAL

## 2023-11-10 VITALS
SYSTOLIC BLOOD PRESSURE: 128 MMHG | HEIGHT: 66 IN | WEIGHT: 173 LBS | BODY MASS INDEX: 27.8 KG/M2 | HEART RATE: 63 BPM | DIASTOLIC BLOOD PRESSURE: 68 MMHG

## 2023-11-10 DIAGNOSIS — E78.00 PURE HYPERCHOLESTEROLEMIA: ICD-10-CM

## 2023-11-10 DIAGNOSIS — I10 PRIMARY HYPERTENSION: ICD-10-CM

## 2023-11-10 DIAGNOSIS — I25.709 CORONARY ARTERY DISEASE INVOLVING CORONARY BYPASS GRAFT OF NATIVE HEART WITH ANGINA PECTORIS: Primary | ICD-10-CM

## 2023-11-10 PROCEDURE — 93000 ELECTROCARDIOGRAM COMPLETE: CPT | Performed by: INTERNAL MEDICINE

## 2023-11-10 PROCEDURE — 99213 OFFICE O/P EST LOW 20 MIN: CPT | Performed by: INTERNAL MEDICINE

## 2023-11-10 RX ORDER — ROSUVASTATIN CALCIUM 40 MG/1
40 TABLET, COATED ORAL DAILY
Qty: 90 TABLET | Refills: 3 | Status: SHIPPED | OUTPATIENT
Start: 2023-11-10

## 2023-11-10 NOTE — PROGRESS NOTES
Date of Office Visit: 11/10/2023  Encounter Provider: Jamee Dinero MD  Place of Service: [facility] CARDIOLOGY  Patient Name: Beto Bassett Dr.  :1955    Chief complaint  Coronary artery calcification.    History of Present Illness  Patient is a 68-year-old gentleman with hypertension, hyperlipidemia, pulmonary nodules, Cordon's esophagus who in May 2023 was noted to have a very high calcium score 1135 with significant burden in the right coronary and circumflex vessels.  He was asymptomatic though sedentary.  He had a vascular screening study that was normal.  Had a stress echocardiogram that showed normal left ventricular size systolic function, normal diastolic function, aortic valve sclerosis without stenosis, mild-mod regurgitation, trivial tricuspid regurgitation with negative saline injection and no ischemia and excellent workload of 13.5 METS.    Since last visit he continues to be asymptomatic.  He is walking on the treadmill for 5 to 6 days a week.  He denies any chest pain palpitations syncope near syncope.  He now notes that his father may not have had aortic aneurysmal disease but had rheumatic fever.    Past Medical History:   Diagnosis Date    Cordon esophagus     Elevated coronary artery calcium score     GERD (gastroesophageal reflux disease)     Hyperlipemia     Hypertension     Seasonal allergies      Past Surgical History:   Procedure Laterality Date    COLONOSCOPY N/A 2017    Procedure: COLONOSCOPY TO CECUM AND INTO TERMINAL ILEUM WITH COLD BIOPSY POLYPECTOMY;  Surgeon: Nasir Klein MD;  Location: [facility] ENDOSCOPY;  Service:     COLONOSCOPY N/A 2023    Procedure: COLONOSCOPY TO CECUM;  Surgeon: Nasir Klein MD;  Location: [facility] ENDOSCOPY;  Service: Gastroenterology;  Laterality: N/A;  HX OF COLON POLYPS  POST:INTERNAL HEMORRHOIDS; FAIR PREP    COLONOSCOPY W/ BIOPSIES  2009    Dr. Lew; 3-4 mm tubular adenoma    ENDOSCOPY N/A 2017     Procedure: ESOPHAGOGASTRODUODENOSCOPY WITH COLD BIOPSIES;  Surgeon: Nasir Klein MD;  Location: [facility] ENDOSCOPY;  Service:     ENDOSCOPY N/A 7/8/2023    Procedure: ESOPHAGOGASTRODUODENOSCOPY WITH BIOPSY;  Surgeon: Nasir Klein MD;  Location: [facility] ENDOSCOPY;  Service: Gastroenterology;  Laterality: N/A;  HX OF MIRANDA'S  POST: GASTRITIS; GASTRIC POLYPS    TONSILLECTOMY       Outpatient Medications Prior to Visit   Medication Sig Dispense Refill    aspirin 81 MG chewable tablet Chew 1 tablet Daily.      hepatitis A (HAVRIX) 1440 EL U/ML vaccine Inject  into the shoulder, thigh, or buttocks. 1 mL 0    losartan (Cozaar) 25 MG tablet Take 1 tablet by mouth Daily. 90 tablet 1    Multiple Vitamins-Minerals (CENTRUM SILVER 50+MEN PO) Take  by mouth.      pantoprazole (PROTONIX) 40 MG EC tablet Take 1 tablet by mouth Daily - Do not crush, chew, or split. 90 tablet 3    pneumococcal polysaccharide 23-valent (PNEUMOVAX-23) 25 MCG/0.5ML vaccine Inject  into the appropriate muscle as directed by prescriber. 0.5 mL 0    Zoster Vac Recomb Adjuvanted (SHINGRIX) 50 MCG/0.5ML reconstituted suspension Inject  into the appropriate muscle as directed by prescriber. 1 each 0    rosuvastatin (CRESTOR) 20 MG tablet Take 1 tablet by mouth Daily. 90 tablet 1    hepatitis A (HAVRIX) 1440 EL U/ML vaccine Inject  into the appropriate muscle as directed by prescriber. (Patient not taking: Reported on 11/10/2023) 1 mL 0    losartan (COZAAR) 25 MG tablet Take 1 tablet by mouth Daily. (Patient not taking: Reported on 11/10/2023) 90 tablet 3    rosuvastatin (CRESTOR) 10 MG tablet Take 1 tablet by mouth Daily. 90 tablet 1    rosuvastatin (CRESTOR) 20 MG tablet Take 1 tablet by mouth Daily. (Patient not taking: Reported on 11/10/2023) 90 tablet 3     No facility-administered medications prior to visit.       Allergies as of 11/10/2023 - Reviewed 11/10/2023   Allergen Reaction Noted    Zocor [simvastatin] Hives 10/27/2017     Social History  "    Socioeconomic History    Marital status:    Tobacco Use    Smoking status: Never    Smokeless tobacco: Never    Tobacco comments:     Caffeine use   Vaping Use    Vaping Use: Never used   Substance and Sexual Activity    Alcohol use: Yes     Alcohol/week: 7.0 standard drinks of alcohol     Types: 7 Standard drinks or equivalent per week     Comment: occas    Drug use: No    Sexual activity: Defer     Family History   Problem Relation Age of Onset    Diabetes Mother         from liver transplant    Lymphoma Mother         from liver transplant    Hypertension Father     Prostate cancer Father     Stroke Father     No Known Problems Sister     No Known Problems Brother     Heart disease Maternal Grandmother     Heart disease Maternal Grandfather     Heart disease Paternal Grandmother     Heart disease Paternal Grandfather     Heart disease Other      Review of Systems   Constitutional: Negative for chills, fever, weight gain and weight loss.   Cardiovascular:  Negative for leg swelling.   Respiratory:  Negative for cough, snoring and wheezing.    Hematologic/Lymphatic: Negative for bleeding problem. Does not bruise/bleed easily.   Skin:  Negative for color change.   Musculoskeletal:  Negative for falls, joint pain and myalgias.   Gastrointestinal:  Negative for melena.   Genitourinary:  Negative for hematuria.   Neurological:  Negative for excessive daytime sleepiness.   Psychiatric/Behavioral:  Negative for depression. The patient is not nervous/anxious.         Objective:     Vitals:    11/10/23 0836   BP: 128/68   Pulse: 63   Weight: 78.5 kg (173 lb)   Height: 166.4 cm (65.5\")     Body mass index is 28.35 kg/m².    Vitals reviewed.   Constitutional:       Appearance: Well-developed.   Eyes:      General: No scleral icterus.        Right eye: No discharge.      Conjunctiva/sclera: Conjunctivae normal.      Pupils: Pupils are equal, round, and reactive to light.   HENT:      Head: Normocephalic.      Nose: " Nose normal.   Neck:      Thyroid: No thyromegaly.      Vascular: No JVD.   Pulmonary:      Effort: Pulmonary effort is normal. No respiratory distress.      Breath sounds: Normal breath sounds. No wheezing. No rales.   Cardiovascular:      Normal rate. Regular rhythm. Normal S1. Normal S2.       Murmurs: There is no murmur.      No gallop.    Pulses:     Intact distal pulses.      Carotid: 2+ bilaterally.     Radial: 2+ bilaterally.     Femoral: 2+ bilaterally.     Popliteal: 2+ bilaterally.     Dorsalis pedis: 2+ bilaterally.     Posterior tibial: 2+ bilaterally.  Edema:     Peripheral edema absent.   Abdominal:      General: Bowel sounds are normal. There is no distension.      Palpations: Abdomen is soft.      Tenderness: There is no abdominal tenderness. There is no rebound.   Musculoskeletal: Normal range of motion.         General: No tenderness.      Cervical back: Normal range of motion and neck supple. Skin:     General: Skin is warm and dry.      Findings: No erythema or rash.   Neurological:      Mental Status: Alert and oriented to person, place, and time.   Psychiatric:         Behavior: Behavior normal.         Thought Content: Thought content normal.         Judgment: Judgment normal.       Lab Review:   Lab Results - Last 18 Months   Lab Units 03/20/23  1641 10/06/22  1335   WBC x10E3/uL 7.2 5.79   RBC x10E6/uL 4.89 5.15   HEMOGLOBIN g/dL 15.3 16.2   HEMATOCRIT % 45.2 46.6   MCV fL 92 90.5   MCH pg 31.3 31.5   MCHC g/dL 33.8 34.8   RDW % 12.3 12.3   PLATELETS x10E3/uL 152 166   NEUTROPHIL % % 41 53.5   LYMPHOCYTE % % 52 39.9   MONOCYTES % % 6 5.2   EOSINOPHIL % % 1 0.9   BASOPHIL % % 0 0.3   NEUTROS ABS x10E3/uL 3.0 3.10   LYMPHS ABS x10E3/uL 3.6* 2.31   MONOS ABS x10E3/uL 0.5 0.30   EOS ABS x10E3/uL 0.1 0.05   BASOS ABS x10E3/uL 0.0 0.02   IMM GRAN % % 0 0.2   IMMATURE GRANS (ABS) x10E3/uL 0.0 0.01       Lab Results - Last 18 Months   Lab Units 06/02/23  0921 10/06/22  1335   GLUCOSE mg/dL  --  97    BUN mg/dL  --  16   CREATININE mg/dL 1.00 1.07   SODIUM mmol/L  --  139   POTASSIUM mmol/L  --  5.1   CHLORIDE mmol/L  --  100   CO2 mmol/L  --  23.9   CALCIUM mg/dL  --  10.2   ALBUMIN g/dL  --  5.30*   ALT (SGPT) U/L  --  31   AST (SGOT) U/L  --  21   ALK PHOS U/L  --  75   BILIRUBIN mg/dL  --  0.7   BUN / CREAT RATIO   --  15.0     Lab Results - Last 18 Months   Lab Units 11/08/23  0929 10/06/22  1335   TRIGLYCERIDES mg/dL 73 60   HDL CHOL mg/dL 66* 71*   LDL CHOL mg/dL 100 110*   VLDL CHOLESTEROL JEANINE mg/dL 14 11       Lab Results - Last 18 Months   Lab Units 10/06/22  1335   TSH uIU/mL 1.200       ECG 12 Lead    Date/Time: 11/10/2023 8:41 AM  Performed by: Jamee Dinero MD    Authorized by: Jamee Dinero MD  Comparison: compared with previous ECG   Similar to previous ECG  Rhythm: sinus rhythm    Clinical impression: normal ECG            Diagnosis Plan   1. 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 Primary hypertension        3. Pure hypercholesterolemia          Plan:       1.  Coronary artery disease with high coronary artery calcium Agatston score involving RCA and circumflex predominantly.  Stress echo 6/2023 at 13.5 METS.  Remains asymptomatic and on aspirin therapy.  Continue with risk factor modification.  2.  Hypertension.  Appears controlled  3.  Hyperlipidemia.  LDL still elevated.  Will increase Crestor to 40 mg a day.  He plans to have labs in the next several months with Dr. Cuevas.  4.  Family history of possible aortic aneurysmal disease.  Prior CT angiogram did not show any Canusal disease.  He now states he believes his father actually had rheumatic fever and an aortic aneurysm.  5.  Cordon's esophagus      Time Spent: I spent 25 minutes caring for Beto on this date of service. This time includes time spent by me in the following activities: preparing for the visit, reviewing tests, obtaining and/or reviewing a separately  obtained history, performing a medically appropriate examination and/or evaluation, counseling and educating the patient/family/caregiver, documenting information in the medical record, and independently interpreting results and communicating that information with the patient/family/caregiver.   I spent 1 minutes on the separately reported service of ECG. This time is not included in the time used to support the E/M service also reported today.        Your medication list            Accurate as of November 10, 2023 11:59 PM. If you have any questions, ask your nurse or doctor.                CHANGE how you take these medications        Instructions Last Dose Given Next Dose Due   rosuvastatin 40 MG tablet  Commonly known as: CRESTOR  What changed:   medication strength  how much to take  Changed by: Jamee Dinero MD      Take 1 tablet by mouth Daily.              CONTINUE taking these medications        Instructions Last Dose Given Next Dose Due   aspirin 81 MG chewable tablet      Chew 1 tablet Daily.       Havrix 1440 EL U/ML vaccine  Generic drug: hepatitis A      Inject  into the shoulder, thigh, or buttocks.       losartan 25 MG tablet  Commonly known as: Cozaar      Take 1 tablet by mouth Daily.       multivitamin with minerals tablet tablet      Take  by mouth.       pantoprazole 40 MG EC tablet  Commonly known as: PROTONIX      Take 1 tablet by mouth Daily - Do not crush, chew, or split.       Pneumovax 23 25 MCG/0.5ML vaccine  Generic drug: pneumococcal polysaccharide 23-valent      Inject  into the appropriate muscle as directed by prescriber.       Shingrix 50 MCG/0.5ML reconstituted suspension  Generic drug: Zoster Vac Recomb Adjuvanted      Inject  into the appropriate muscle as directed by prescriber.                 Where to Get Your Medications        These medications were sent to [place] [id number]      Hours: Monday to Friday 7 AM to 6 PM,  Saturday & Sunday 8 AM to 4:30 PM (Closed 12 PM to 12:30 PM) Phone: [phone]   rosuvastatin 40 MG tablet         Patient is no longer taking -.  I corrected the med list to reflect this.  I did not stop these medications.      Dictated utilizing Dragon dictation

## 2023-12-26 DIAGNOSIS — Z00.00 ANNUAL PHYSICAL EXAM: Primary | ICD-10-CM

## 2024-01-09 LAB
ALBUMIN SERPL-MCNC: 4.8 G/DL (ref 3.9–4.9)
ALBUMIN/GLOB SERPL: 2.1 {RATIO} (ref 1.2–2.2)
ALP SERPL-CCNC: 63 IU/L (ref 44–121)
ALT SERPL-CCNC: 43 IU/L (ref 0–44)
AST SERPL-CCNC: 30 IU/L (ref 0–40)
BASOPHILS # BLD AUTO: 0 X10E3/UL (ref 0–0.2)
BASOPHILS NFR BLD AUTO: 0 %
BILIRUB SERPL-MCNC: 0.5 MG/DL (ref 0–1.2)
BUN SERPL-MCNC: 15 MG/DL (ref 8–27)
BUN/CREAT SERPL: 17 (ref 10–24)
CALCIUM SERPL-MCNC: 9.6 MG/DL (ref 8.6–10.2)
CHLORIDE SERPL-SCNC: 98 MMOL/L (ref 96–106)
CHOLEST SERPL-MCNC: 172 MG/DL (ref 100–199)
CO2 SERPL-SCNC: 20 MMOL/L (ref 20–29)
CREAT SERPL-MCNC: 0.88 MG/DL (ref 0.76–1.27)
EGFRCR SERPLBLD CKD-EPI 2021: 93 ML/MIN/1.73
EOSINOPHIL # BLD AUTO: 0.1 X10E3/UL (ref 0–0.4)
EOSINOPHIL NFR BLD AUTO: 1 %
ERYTHROCYTE [DISTWIDTH] IN BLOOD BY AUTOMATED COUNT: 11.9 % (ref 11.6–15.4)
EST. AVERAGE GLUCOSE BLD GHB EST-MCNC: 114 MG/DL
FT4I SERPL CALC-MCNC: 1.9 (ref 1.2–4.9)
GLOBULIN SER CALC-MCNC: 2.3 G/DL (ref 1.5–4.5)
GLUCOSE SERPL-MCNC: 112 MG/DL (ref 70–99)
HBA1C MFR BLD: 5.6 % (ref 4.8–5.6)
HBV SURFACE AG SERPL QL IA: NEGATIVE
HCT VFR BLD AUTO: 45.3 % (ref 37.5–51)
HCV IGG SERPL QL IA: NON REACTIVE
HDLC SERPL-MCNC: 73 MG/DL
HGB BLD-MCNC: 15.5 G/DL (ref 13–17.7)
HIV 1+2 AB+HIV1 P24 AG SERPL QL IA: NON REACTIVE
IMM GRANULOCYTES # BLD AUTO: 0 X10E3/UL (ref 0–0.1)
IMM GRANULOCYTES NFR BLD AUTO: 0 %
LDLC SERPL CALC-MCNC: 88 MG/DL (ref 0–99)
LYMPHOCYTES # BLD AUTO: 3.7 X10E3/UL (ref 0.7–3.1)
LYMPHOCYTES NFR BLD AUTO: 57 %
MCH RBC QN AUTO: 30.7 PG (ref 26.6–33)
MCHC RBC AUTO-ENTMCNC: 34.2 G/DL (ref 31.5–35.7)
MCV RBC AUTO: 90 FL (ref 79–97)
MONOCYTES # BLD AUTO: 0.3 X10E3/UL (ref 0.1–0.9)
MONOCYTES NFR BLD AUTO: 5 %
NEUTROPHILS # BLD AUTO: 2.4 X10E3/UL (ref 1.4–7)
NEUTROPHILS NFR BLD AUTO: 37 %
PLATELET # BLD AUTO: 139 X10E3/UL (ref 150–450)
POTASSIUM SERPL-SCNC: 4.4 MMOL/L (ref 3.5–5.2)
PROT SERPL-MCNC: 7.1 G/DL (ref 6–8.5)
RBC # BLD AUTO: 5.05 X10E6/UL (ref 4.14–5.8)
SODIUM SERPL-SCNC: 140 MMOL/L (ref 134–144)
T3RU NFR SERPL: 29 % (ref 24–39)
T4 SERPL-MCNC: 6.6 UG/DL (ref 4.5–12)
TRIGL SERPL-MCNC: 55 MG/DL (ref 0–149)
TSH SERPL DL<=0.005 MIU/L-ACNC: 1.54 UIU/ML (ref 0.45–4.5)
VLDLC SERPL CALC-MCNC: 11 MG/DL (ref 5–40)
WBC # BLD AUTO: 6.5 X10E3/UL (ref 3.4–10.8)

## 2024-01-12 LAB — PSA SERPL-MCNC: 0.4 NG/ML (ref 0–4)

## 2024-01-16 LAB — SPECIMEN STATUS: NORMAL

## 2024-04-17 DIAGNOSIS — I10 PRIMARY HYPERTENSION: ICD-10-CM

## 2024-04-18 RX ORDER — LOSARTAN POTASSIUM 25 MG/1
25 TABLET ORAL DAILY
Qty: 90 TABLET | Refills: 1 | OUTPATIENT
Start: 2024-04-18

## 2024-04-29 ENCOUNTER — TELEPHONE (OUTPATIENT)
Dept: GASTROENTEROLOGY | Facility: CLINIC | Age: 69
End: 2024-04-29
Payer: COMMERCIAL

## 2024-04-29 ENCOUNTER — PREP FOR SURGERY (OUTPATIENT)
Dept: OTHER | Facility: HOSPITAL | Age: 69
End: 2024-04-29
Payer: COMMERCIAL

## 2024-04-29 DIAGNOSIS — Z86.010 HX OF ADENOMATOUS COLONIC POLYPS: Primary | ICD-10-CM

## 2024-04-29 NOTE — TELEPHONE ENCOUNTER
LAST C/S  7/8/23 IN EPIC     PERSONAL HX OF POLYPS    UNKNOWN FAMILY HX OF POLYPS     FAMILY HX OF COLON CA            LIST OF  MEDICATIONS    ROSUVASTATIN   EZETIMIBE  LOSARTAN   PANTOPRAZOLE  CO Q 10   ASPRIN           OA QUESTIONNAIRE SCANNED IN MEDIA

## 2024-05-16 ENCOUNTER — TELEPHONE (OUTPATIENT)
Dept: GASTROENTEROLOGY | Facility: CLINIC | Age: 69
End: 2024-05-16
Payer: COMMERCIAL

## 2024-05-16 NOTE — TELEPHONE ENCOUNTER
CALLED SW PT NO ANSWER LVM COMMUNICATED CALENDAR OUT INTO NOVEMBER AND CAN DEFER TO 05/28 TO SCHEDULE FOR 11/26/2024 OK FOR HUB TO RELAY    Cindy Curran is an 25 year old female. IUP at 36 week presented to L and D for painful contractions that started around 2 hrs ago. She denied any ROM. Pregnancy is significant for cervical incompetence with loss at 21 weeks and  delivery by C/Section of twins at 34 weeks. On L and D, pt was noted to be david every 2 to 3 mins. Cervical exam: 50%/ 2 cm / -3. Gp B strep unknown.     Past Medical History:   Diagnosis Date   • Alcohol abuse 2021   • Anxiety    • Depression      Past Surgical History:   Procedure Laterality Date   • Carpal tunnel release Left    •  section, low transverse  2022    PLTCS due to category 2 fetal tracing baby A--Dr. Melchor     History reviewed. No pertinent family history.  Social History     Tobacco Use   • Smoking status: Former     Packs/day: 0.00     Types: Cigarettes   • Smokeless tobacco: Never   Substance Use Topics   • Alcohol use: Not Currently     Alcohol/week: 4.0 standard drinks     Types: 4 Standard drinks or equivalent per week     Comment: socially       Prior to Admission Meds:  Medications Prior to Admission   Medication Sig Dispense Refill   • terconazole 0.4 % vaginal cream Place 1 applicator vaginally nightly. 45 g 0   • Ferrous Sulfate (Slow Fe) 142 (45 Fe) MG Tab CR Take 1 tablet by mouth daily. 90 tablet 1   • Cholecalciferol (Vitamin D) 125 MCG (5000 UT) Cap Take 1 capsule by mouth daily. 90 capsule 1   • lamoTRIgine (LaMICtal) 25 MG tablet Take 1 tab po daily, psychiatry to titrate further 30 tablet 1   • FLUoxetine (PROzac) 20 MG capsule Take 1 capsule by mouth daily. 30 capsule 0   • terconazole 0.4 % vaginal cream Place 1 applicator vaginally nightly. 45 g 0   • clotrimazole-betamethasone (LOTRISONE) 1-0.05 % cream Apply 1 application. topically 2 times daily. 30 g 0   • famotidine (PEPCID) 20 MG tablet Take 1 tablet by mouth in the morning and 1 tablet in the evening. 60 tablet 1   • ondansetron (ZOFRAN ODT) 4 MG  disintegrating tablet Place 1 tablet onto the tongue every 8 hours as needed for Nausea. 30 tablet 2   • Prenatal Vit-Fe Fumarate-FA (multivitamin & mineral w/folic acid- PRENATAL) 27-1 MG Tab Take 1 tablet by mouth daily. 30 tablet 12     Scheduled Meds:  Current Facility-Administered Medications   Medication Dose Route Frequency Provider Last Rate Last Admin   • [START ON 4/11/2023] sodium chloride (PF) 0.9 % injection 2 mL  2 mL Intracatheter 2 times per day Padmini HERNANDES Ray, DO       • penicillin G potassium (PFIZERPEN) 5 Million Units in sodium chloride 0.9 % 250 mL IVPB  5 Million Units Intravenous Once Padmini A Ray, DO        Followed by   • [START ON 4/11/2023] penicillin G potassium (PFIZERPEN) in sodium chloride 0.9% 100 mL IVPB 2.5 Million Units  2.5 Million Units Intravenous Q4H Padmini HERNANDES Ray, DO       • lactated ringers bolus 500 mL  500 mL Intravenous Once Padmini HERNANDES Ray,  mL/hr at 04/10/23 2332 500 mL at 04/10/23 2332     Continuous Infusions:  Current Facility-Administered Medications   Medication Dose Route Frequency Provider Last Rate Last Admin   • oxyTOCIN (PITOCIN) 30 Units in sodium chloride 0.9% 500 mL  0-334 mL/hr Intravenous Continuous Padmini A Ray, DO       • lactated ringers infusion   Intravenous Continuous Padmini HERNANDES Ray, DO         PRN Meds:  Current Facility-Administered Medications   Medication Dose Route Frequency Provider Last Rate Last Admin   • ondansetron (ZOFRAN) injection 4 mg  4 mg Intravenous BID PRN Padmini A Ray, DO       • calcium carbonate (TUMS) chewable tablet 500 mg  500 mg Oral Q4H PRN Padmini A Ray, DO       • lidocaine HCl (PF) (XYLOCAINE) 1 % injection 300 mg  30 mL Subcutaneous Once PRN Padmini A Ray, DO       • oxyTOCIN (PITOCIN) injection 10 Units  10 Units Intramuscular Once PRN Padmini A Ray, DO       • sodium chloride 0.9 % flush bag 25 mL  25 mL Intravenous PRN Padmini A Ray, DO       • butorphanol (STADOL) injection 1 mg  1 mg Intravenous Q2H PRN Padmini A Ray, DO            Allergies: ALLERGIES:  No Known Allergies    Principal Problem:    Abdominal pain during pregnancy in third trimester    Blood pressure 114/72, pulse 91, temperature 98.7 °F (37.1 °C), temperature source Oral, resp. rate 18, last menstrual period 2022, SpO2 100 %, not currently breastfeeding.    Review of Systems   Constitutional: Negative for chills and fever.   Gastrointestinal: Abdominal pain: with contractions.        Physical Exam  Vitals reviewed.   Constitutional:       General: Distressed: with contractions.      Appearance: Normal appearance.   Abdominal:      Palpations: Abdomen is soft.   Neurological:      Mental Status: She is alert.     Cervical exam: 50%/ 2 cm / -3    Assessment:  24 yo  IUP at 36 weeks presented to L and D with painful contractions, history of  delivery of twins at 34 weeks, history of C/Section. I offered pt TOLAC vs repeat C/section in case pt goes into  labor. Risks and benefits of each option had been explained to pt and pt desires TOLAC    Plan:  Admit pt for observation  IV fluid  TOLAC consent obtained  Will start PCN if pt made cervical change    Idalia Best MD  4/10/2023

## 2024-05-22 ENCOUNTER — TELEPHONE (OUTPATIENT)
Dept: GASTROENTEROLOGY | Facility: CLINIC | Age: 69
End: 2024-05-22
Payer: COMMERCIAL

## 2024-05-22 NOTE — TELEPHONE ENCOUNTER
Hub staff attempted to follow warm transfer process and was unsuccessful     Caller: Beto Bassett Dr.    Relationship to patient: Self    Best call back number: [phone]    Patient is needing: PATIENT CALLED IN, HE SAID THAT HE WON'T BE ABLE TO DO 11/26/24 WANTS TO TRY AND SCHEDULE FOR THAT NEXT WEEK IF POSSIBLE. PLEASE CONTACT PATIENT AS SOON AS POSSIBLE AS HE WANTS TO GET A DATE SCHEDULED HE SAID. YOU CAN LEAVE HIM A DETAILED VMAIL.

## 2024-05-22 NOTE — TELEPHONE ENCOUNTER
Hub staff attempted to follow warm transfer process and was unsuccessful     Caller: Beto Bassett Dr.    Relationship to patient: Self    Best call back number: [phone]    Patient is needing: PATIENT CALLED IN, HE SAID THAT HE WON'T BE ABLE TO DO 11/26/24 WANTS TO TRY AND SCHEDULE FOR THAT NEXT WEEK IF POSSIBLE. PLEASE CONTACT PATIENT AS SOON AS POSSIBLE AS HE WANTS TO GET A DATE SCHEDULED HE SAID. YOU CAN LEAVE HIM A DETAILED VMAIL.

## 2024-05-22 NOTE — TELEPHONE ENCOUNTER
[facility] PT COMMUNICATED AND AGREEABLE TO CALLING OR BE CALLED ON 06/03/2024 TO SCHEDULE FOR 12/3/2024 D/T HE WILL BE OUT OF TOWN IN NOVEMBER.OK FOR HUB TO RELAY

## 2024-05-30 NOTE — PROGRESS NOTES
Left knee done date of Office Visit: 2024  Encounter Provider: Jamee Dinero MD  Place of Service: [facility] CARDIOLOGY  Patient Name: Beto Bassett Dr.  :1955    Chief complaint  Coronary artery disease    History of Present Illness  Patient is a 69-year-old gentleman with hypertension, hyperlipidemia, pulmonary nodules, Cordon's esophagus who in May 2023 was noted to have a very high calcium score 1135 with significant burden in the right coronary and circumflex vessels.  He was asymptomatic though sedentary.  He had a vascular screening study that was normal.  Had a stress echocardiogram that showed normal left ventricular size systolic function, normal diastolic function, aortic valve sclerosis without stenosis, mild-mod regurgitation, trivial tricuspid regurgitation with negative saline injection and no ischemia and excellent workload of 13.5 METS.  He had a negative vascular screening study 2023      Since last visit he said no chest pain shortness of breath palpitations syncope near syncope.  He is working on the treadmill for 30 minutes 5 to 6 days a week.    Past Medical History:   Diagnosis Date    Cordon esophagus     Elevated coronary artery calcium score     GERD (gastroesophageal reflux disease)     Hyperlipemia     Hypertension     Seasonal allergies      Past Surgical History:   Procedure Laterality Date    COLONOSCOPY N/A 2017    Procedure: COLONOSCOPY TO CECUM AND INTO TERMINAL ILEUM WITH COLD BIOPSY POLYPECTOMY;  Surgeon: Nasir Klein MD;  Location: [facility] ENDOSCOPY;  Service:     COLONOSCOPY N/A 2023    Procedure: COLONOSCOPY TO CECUM;  Surgeon: Nasir Klein MD;  Location: [facility] ENDOSCOPY;  Service: Gastroenterology;  Laterality: N/A;  HX OF COLON POLYPS  POST:INTERNAL HEMORRHOIDS; FAIR PREP    COLONOSCOPY W/ BIOPSIES  2009    Dr. Lew; 3-4 mm tubular adenoma    ENDOSCOPY N/A 2017    Procedure: ESOPHAGOGASTRODUODENOSCOPY WITH  COLD BIOPSIES;  Surgeon: Nasir Klein MD;  Location: [facility] ENDOSCOPY;  Service:     ENDOSCOPY N/A 7/8/2023    Procedure: ESOPHAGOGASTRODUODENOSCOPY WITH BIOPSY;  Surgeon: Nasir Klein MD;  Location: [facility] ENDOSCOPY;  Service: Gastroenterology;  Laterality: N/A;  HX OF MIRANDA'S  POST: GASTRITIS; GASTRIC POLYPS    TONSILLECTOMY       Outpatient Medications Prior to Visit   Medication Sig Dispense Refill    aspirin 81 MG chewable tablet Chew 1 tablet Daily.      coenzyme Q10 100 MG capsule Take 2 capsules by mouth Daily.      ezetimibe (ZETIA) 10 MG tablet Take 1 tablet by mouth Daily.      hepatitis A (HAVRIX) 1440 EL U/ML vaccine Inject  into the shoulder, thigh, or buttocks. 1 mL 0    losartan (Cozaar) 25 MG tablet Take 1 tablet by mouth Daily. 90 tablet 1    Multiple Vitamins-Minerals (CENTRUM SILVER 50+MEN PO) Take  by mouth.      pantoprazole (PROTONIX) 40 MG EC tablet Take 1 tablet by mouth Daily.      pneumococcal polysaccharide 23-valent (PNEUMOVAX-23) 25 MCG/0.5ML vaccine Inject  into the appropriate muscle as directed by prescriber. 0.5 mL 0    rosuvastatin (CRESTOR) 40 MG tablet Take 1 tablet by mouth Daily. 90 tablet 3    Zoster Vac Recomb Adjuvanted (SHINGRIX) 50 MCG/0.5ML reconstituted suspension Inject  into the appropriate muscle as directed by prescriber. 1 each 0     No facility-administered medications prior to visit.       Allergies as of 05/31/2024 - Reviewed 11/19/2023   Allergen Reaction Noted    Zocor [simvastatin] Hives 10/27/2017     Social History     Socioeconomic History    Marital status:    Tobacco Use    Smoking status: Never     Passive exposure: Never    Smokeless tobacco: Never    Tobacco comments:     Caffeine use   Vaping Use    Vaping status: Never Used   Substance and Sexual Activity    Alcohol use: Yes     Alcohol/week: 7.0 standard drinks of alcohol     Types: 7 Standard drinks or equivalent per week     Comment: occas    Drug use: No    Sexual activity: Defer  "    Family History   Problem Relation Age of Onset    Diabetes Mother         from liver transplant    Lymphoma Mother         from liver transplant    Hypertension Father     Prostate cancer Father     Stroke Father     No Known Problems Sister     No Known Problems Brother     Heart disease Maternal Grandmother     Heart disease Maternal Grandfather     Heart disease Paternal Grandmother     Heart disease Paternal Grandfather     Heart disease Other      Review of Systems   Constitutional: Positive for weight gain. Negative for chills, fever and weight loss.   Cardiovascular:  Negative for leg swelling.   Respiratory:  Negative for cough, snoring and wheezing.    Hematologic/Lymphatic: Negative for bleeding problem. Does not bruise/bleed easily.   Skin:  Negative for color change.   Musculoskeletal:  Negative for falls, joint pain and myalgias.   Gastrointestinal:  Negative for melena.   Genitourinary:  Negative for hematuria.   Neurological:  Negative for excessive daytime sleepiness.   Psychiatric/Behavioral:  Negative for depression. The patient is not nervous/anxious.         Objective:     Vitals:    05/31/24 0724   BP: 130/70   BP Location: Left arm   Patient Position: Sitting   Pulse: 63   Weight: 81.2 kg (179 lb)   Height: 167.6 cm (66\")     Body mass index is 28.89 kg/m².    Vitals reviewed.   Constitutional:       Appearance: Well-developed.   Eyes:      General: No scleral icterus.        Right eye: No discharge.      Conjunctiva/sclera: Conjunctivae normal.      Pupils: Pupils are equal, round, and reactive to light.   HENT:      Head: Normocephalic.      Nose: Nose normal.   Neck:      Thyroid: No thyromegaly.      Vascular: No JVD.   Pulmonary:      Effort: Pulmonary effort is normal. No respiratory distress.      Breath sounds: Normal breath sounds. No wheezing. No rales.   Cardiovascular:      Normal rate. Regular rhythm. Normal S1. Normal S2.       Murmurs: There is no murmur.      No gallop.  "   Pulses:     Intact distal pulses.      Carotid: 2+ bilaterally.     Radial: 2+ bilaterally.     Femoral: 2+ bilaterally.     Popliteal: 2+ bilaterally.     Dorsalis pedis: 2+ bilaterally.     Posterior tibial: 2+ bilaterally.  Edema:     Peripheral edema absent.   Abdominal:      General: Bowel sounds are normal. There is no distension.      Palpations: Abdomen is soft.      Tenderness: There is no abdominal tenderness. There is no rebound.   Musculoskeletal: Normal range of motion.         General: No tenderness.      Cervical back: Normal range of motion and neck supple. Skin:     General: Skin is warm and dry.      Findings: No erythema or rash.   Neurological:      Mental Status: Alert and oriented to person, place, and time.   Psychiatric:         Behavior: Behavior normal.         Thought Content: Thought content normal.         Judgment: Judgment normal.       Lab Review:   Lab Results - Last 18 Months   Lab Units 01/08/24  1054 03/20/23  1641   WBC x10E3/uL 6.5 7.2   RBC x10E6/uL 5.05 4.89   HEMOGLOBIN g/dL 15.5 15.3   HEMATOCRIT % 45.3 45.2   MCV fL 90 92   MCH pg 30.7 31.3   MCHC g/dL 34.2 33.8   RDW % 11.9 12.3   PLATELETS x10E3/uL 139* 152   NEUTROPHIL % % 37 41   LYMPHOCYTE % % 57 52   MONOCYTES % % 5 6   EOSINOPHIL % % 1 1   BASOPHIL % % 0 0   NEUTROS ABS x10E3/uL 2.4 3.0   LYMPHS ABS x10E3/uL 3.7* 3.6*   MONOS ABS x10E3/uL 0.3 0.5   EOS ABS x10E3/uL 0.1 0.1   BASOS ABS x10E3/uL 0.0 0.0   IMM GRAN % % 0 0   IMMATURE GRANS (ABS) x10E3/uL 0.0 0.0       Lab Results - Last 18 Months   Lab Units 01/08/24  1054 06/02/23  0921   GLUCOSE mg/dL 112*  --    BUN mg/dL 15  --    CREATININE mg/dL 0.88 1.00   SODIUM mmol/L 140  --    POTASSIUM mmol/L 4.4  --    CHLORIDE mmol/L 98  --    CO2 mmol/L 20  --    CALCIUM mg/dL 9.6  --    ALBUMIN g/dL 4.8  --    ALT (SGPT) IU/L 43  --    AST (SGOT) IU/L 30  --    ALK PHOS IU/L 63  --    BILIRUBIN mg/dL 0.5  --    BUN / CREAT RATIO  17  --      Lab Results - Last 18  Months   Lab Units 01/08/24  1054 11/08/23  0929   TRIGLYCERIDES mg/dL 55 73   HDL CHOL mg/dL 73 66*   LDL CHOL mg/dL 88 100   VLDL CHOLESTEROL JEANINE mg/dL 11 14       Lab Results - Last 18 Months   Lab Units 01/08/24  1054   TSH uIU/mL 1.540       ECG 12 Lead    Date/Time: 5/31/2024 1:01 AM  Performed by: Jamee Dinero MD    Authorized by: Jamee Dinero MD  Comparison: compared with previous ECG   Comparison to previous ECG: Nonspecific ST-T wave changes noted in the inferior leads    Rhythm: sinus rhythm  Other findings: non-specific ST-T wave changes        Assessment:       Diagnosis Plan   1. Coronary artery disease involving coronary bypass graft of native heart with angina pectoris  Adult Stress Echo W/ Cont or Stress Agent if Necessary Per Protocol    ECG 12 Lead    Homocysteine      2. Abnormal EKG  Adult Stress Echo W/ Cont or Stress Agent if Necessary Per Protocol    ECG 12 Lead      3. Essential (primary) hypertension  ECG 12 Lead    Homocysteine      4. Primary hypertension        5. Pure hypercholesterolemia        6. Coronary artery calcification          Plan:       1.  Coronary artery disease with high coronary artery calcium Agatston score involving RCA and circumflex predominantly.  Stress echo 6/2023 was normal however and with no ST-T wave changes heavy calcification noted previously RCA.  With this in mind we will repeat a stress echo.  Will also have him check a homocystine level  2.  Hypertension.  Fair control  3.  Hyperlipidemia.  Appears controlled.  4.  Family history of possible aortic aneurysmal disease.  Patient had a negative vascular screening study 6/2023 including abdominal imaging.  5.  Cordon's esophagus      Time Spent: I spent 35 minutes caring for Beto on this date of service. This time includes time spent by me in the following activities: preparing for the visit, reviewing tests, obtaining and/or reviewing a separately obtained history, performing a medically appropriate  examination and/or evaluation, counseling and educating the patient/family/caregiver, ordering medications, tests, or procedures, documenting information in the medical record, and independently interpreting results and communicating that information with the patient/family/caregiver.   I spent 1 minutes on the separately reported service of ECG. This time is not included in the time used to support the E/M service also reported today.        Your medication list            Accurate as of May 31, 2024 11:59 PM. If you have any questions, ask your nurse or doctor.                CONTINUE taking these medications        Instructions Last Dose Given Next Dose Due   aspirin 81 MG chewable tablet      Chew 1 tablet Daily.       coenzyme Q10 100 MG capsule      Take 2 capsules by mouth Daily.       ezetimibe 10 MG tablet  Commonly known as: ZETIA      Take 1 tablet by mouth Daily.       Havrix 1440 EL U/ML vaccine  Generic drug: hepatitis A      Inject  into the shoulder, thigh, or buttocks.       losartan 25 MG tablet  Commonly known as: Cozaar      Take 1 tablet by mouth Daily.       multivitamin with minerals tablet tablet      Take  by mouth.       pantoprazole 40 MG EC tablet  Commonly known as: PROTONIX      Take 1 tablet by mouth Daily.       Pneumovax 23 25 MCG/0.5ML vaccine  Generic drug: pneumococcal polysaccharide 23-valent      Inject  into the appropriate muscle as directed by prescriber.       rosuvastatin 40 MG tablet  Commonly known as: CRESTOR      Take 1 tablet by mouth Daily.       Shingrix 50 MCG/0.5ML reconstituted suspension  Generic drug: Zoster Vac Recomb Adjuvanted      Inject  into the appropriate muscle as directed by prescriber.                Patient is no longer taking -.  I corrected the med list to reflect this.  I did not stop these medications.      Dictated utilizing Dragon dictation

## 2024-05-31 ENCOUNTER — OFFICE VISIT (OUTPATIENT)
Dept: CARDIOLOGY | Facility: CLINIC | Age: 69
End: 2024-05-31
Payer: MEDICARE

## 2024-05-31 VITALS
WEIGHT: 179 LBS | DIASTOLIC BLOOD PRESSURE: 70 MMHG | BODY MASS INDEX: 28.77 KG/M2 | SYSTOLIC BLOOD PRESSURE: 130 MMHG | HEIGHT: 66 IN | HEART RATE: 63 BPM

## 2024-05-31 DIAGNOSIS — I25.84 CORONARY ARTERY CALCIFICATION: ICD-10-CM

## 2024-05-31 DIAGNOSIS — E78.00 PURE HYPERCHOLESTEROLEMIA: ICD-10-CM

## 2024-05-31 DIAGNOSIS — I25.10 CORONARY ARTERY CALCIFICATION: ICD-10-CM

## 2024-05-31 DIAGNOSIS — I10 ESSENTIAL (PRIMARY) HYPERTENSION: ICD-10-CM

## 2024-05-31 DIAGNOSIS — R94.31 ABNORMAL EKG: ICD-10-CM

## 2024-05-31 DIAGNOSIS — I25.709 CORONARY ARTERY DISEASE INVOLVING CORONARY BYPASS GRAFT OF NATIVE HEART WITH ANGINA PECTORIS: Primary | ICD-10-CM

## 2024-05-31 DIAGNOSIS — I10 PRIMARY HYPERTENSION: ICD-10-CM

## 2024-05-31 PROCEDURE — 1159F MED LIST DOCD IN RCRD: CPT | Performed by: INTERNAL MEDICINE

## 2024-05-31 PROCEDURE — 3078F DIAST BP <80 MM HG: CPT | Performed by: INTERNAL MEDICINE

## 2024-05-31 PROCEDURE — 3075F SYST BP GE 130 - 139MM HG: CPT | Performed by: INTERNAL MEDICINE

## 2024-05-31 PROCEDURE — 93000 ELECTROCARDIOGRAM COMPLETE: CPT | Performed by: INTERNAL MEDICINE

## 2024-05-31 PROCEDURE — 99214 OFFICE O/P EST MOD 30 MIN: CPT | Performed by: INTERNAL MEDICINE

## 2024-05-31 PROCEDURE — 1160F RVW MEDS BY RX/DR IN RCRD: CPT | Performed by: INTERNAL MEDICINE

## 2024-05-31 RX ORDER — EZETIMIBE 10 MG/1
10 TABLET ORAL DAILY
COMMUNITY
Start: 2024-01-10 | End: 2024-07-08

## 2024-05-31 RX ORDER — UBIDECARENONE 100 MG
200 CAPSULE ORAL DAILY
COMMUNITY

## 2024-05-31 RX ORDER — PANTOPRAZOLE SODIUM 40 MG/1
40 TABLET, DELAYED RELEASE ORAL DAILY
COMMUNITY
Start: 2024-01-10 | End: 2024-07-08

## 2024-06-02 ENCOUNTER — TELEPHONE (OUTPATIENT)
Age: 69
End: 2024-06-02
Payer: MEDICARE

## 2024-06-02 NOTE — TELEPHONE ENCOUNTER
Please let patient know I would also like him to check a blood test for homocystine level as low LDL and aggravate coronary disease.  I have placed an order

## 2024-06-03 ENCOUNTER — TELEPHONE (OUTPATIENT)
Dept: GASTROENTEROLOGY | Facility: CLINIC | Age: 69
End: 2024-06-03
Payer: MEDICARE

## 2024-06-03 ENCOUNTER — LAB (OUTPATIENT)
Dept: LAB | Facility: HOSPITAL | Age: 69
End: 2024-06-03
Payer: MEDICARE

## 2024-06-03 ENCOUNTER — TELEPHONE (OUTPATIENT)
Dept: CARDIOLOGY | Facility: CLINIC | Age: 69
End: 2024-06-03
Payer: MEDICARE

## 2024-06-03 DIAGNOSIS — I25.709 CORONARY ARTERY DISEASE INVOLVING CORONARY BYPASS GRAFT OF NATIVE HEART WITH ANGINA PECTORIS: ICD-10-CM

## 2024-06-03 DIAGNOSIS — I10 ESSENTIAL (PRIMARY) HYPERTENSION: ICD-10-CM

## 2024-06-03 PROBLEM — I25.10 CORONARY ARTERY CALCIFICATION: Status: ACTIVE | Noted: 2024-06-03

## 2024-06-03 PROBLEM — I25.84 CORONARY ARTERY CALCIFICATION: Status: ACTIVE | Noted: 2024-06-03

## 2024-06-03 LAB — HCYS SERPL-MCNC: 11.4 UMOL/L (ref 0–15)

## 2024-06-03 PROCEDURE — 83090 ASSAY OF HOMOCYSTEINE: CPT

## 2024-06-03 PROCEDURE — 36415 COLL VENOUS BLD VENIPUNCTURE: CPT

## 2024-06-03 NOTE — TELEPHONE ENCOUNTER
ARFA HARVEY IN SCHEDULING PT SCHEDULED 12/03/2024 ARRIVING AT 0700AM PREP INSTRUCTIONS SENT TO MY CHART AND PT REQUESTED GOLYTLEY C/O MIRALAX PREP DOESN'T WORK FOR HIM GOLYTLEY PRESCRIPTION SENT TO PHARMACY ON FILE VERIFIED BY THE PT..OK FOR HUB TO RELAY

## 2024-06-03 NOTE — TELEPHONE ENCOUNTER
RAFA HARVEY IN SCHEDULING PT SCHEDULED 12/03/2024 ARRIVING AT 0700AM PREP INSTRUCTIONS SENT TO MY CHART AND PT REQUESTED GOLYTLEY C/O MIRALAX PREP DOESN'T WORK FOR HIM GOLYTLEY PRESCRIPTION SENT TO PHARMACY ON FILE VERIFIED BY THE PT..OK FOR HUB TO RELAY

## 2024-06-03 NOTE — TELEPHONE ENCOUNTER
Reviewed recommendations with Beto Bassett Dr. and the patient verbalized understanding of the recommendations.  Patient agreeable and stated he will have lab drawn at [facility] outpatient lab.    Thank you,  Lilliana OTTO RN  Triage Nurse [facility] [place]   09:05 EDT

## 2024-06-03 NOTE — TELEPHONE ENCOUNTER
Please let him know that homocystine level was normal.  Will update with results of stress echocardiogram when they are available.

## 2024-06-03 NOTE — TELEPHONE ENCOUNTER
Reviewed results and recommendations with Beto Bassett Dr..  Patient verbalized understanding of results and recommendations.    Thank you,  Lilliana OTTO RN  Triage Nurse [facility] [place]   12:05 EDT

## 2024-06-03 NOTE — TELEPHONE ENCOUNTER
Patient called in to schedule Colonoscopy. He was advised to call today. Please return call [phone] and advise pt.

## 2024-07-09 ENCOUNTER — TELEPHONE (OUTPATIENT)
Dept: CARDIOLOGY | Facility: HOSPITAL | Age: 69
End: 2024-07-09
Payer: MEDICARE

## 2024-07-09 ENCOUNTER — TELEPHONE (OUTPATIENT)
Dept: CARDIOLOGY | Facility: CLINIC | Age: 69
End: 2024-07-09

## 2024-07-09 ENCOUNTER — HOSPITAL ENCOUNTER (OUTPATIENT)
Dept: CARDIOLOGY | Facility: HOSPITAL | Age: 69
Discharge: HOME | End: 2024-07-09
Payer: MEDICARE

## 2024-07-09 VITALS
RESPIRATION RATE: 16 BRPM | OXYGEN SATURATION: 95 % | SYSTOLIC BLOOD PRESSURE: 117 MMHG | HEART RATE: 68 BPM | DIASTOLIC BLOOD PRESSURE: 69 MMHG

## 2024-07-09 VITALS
SYSTOLIC BLOOD PRESSURE: 132 MMHG | HEIGHT: 66 IN | HEART RATE: 72 BPM | DIASTOLIC BLOOD PRESSURE: 78 MMHG | BODY MASS INDEX: 28.77 KG/M2 | WEIGHT: 179 LBS | OXYGEN SATURATION: 98 %

## 2024-07-09 DIAGNOSIS — I25.709 CORONARY ARTERY DISEASE INVOLVING CORONARY BYPASS GRAFT OF NATIVE HEART WITH ANGINA PECTORIS: ICD-10-CM

## 2024-07-09 DIAGNOSIS — R94.31 ABNORMAL EKG: ICD-10-CM

## 2024-07-09 DIAGNOSIS — T78.40XA ALLERGIC REACTION, INITIAL ENCOUNTER: Primary | ICD-10-CM

## 2024-07-09 LAB
AORTIC ARCH: 3.4 CM
AORTIC DIMENSIONLESS INDEX: 0.8 (DI)
ASCENDING AORTA: 3.6 CM
BH CV ECHO MEAS - ACS: 2.1 CM
BH CV ECHO MEAS - AO MAX PG: 6.2 MMHG
BH CV ECHO MEAS - AO MEAN PG: 3 MMHG
BH CV ECHO MEAS - AO ROOT DIAM: 3.8 CM
BH CV ECHO MEAS - AO V2 MAX: 124 CM/SEC
BH CV ECHO MEAS - AO V2 VTI: 26.9 CM
BH CV ECHO MEAS - AVA(I,D): 2.37 CM2
BH CV ECHO MEAS - EDV(CUBED): 157.5 ML
BH CV ECHO MEAS - EDV(MOD-SP2): 127 ML
BH CV ECHO MEAS - EDV(MOD-SP4): 151 ML
BH CV ECHO MEAS - EF(MOD-BP): 57.6 %
BH CV ECHO MEAS - EF(MOD-SP2): 58.3 %
BH CV ECHO MEAS - EF(MOD-SP4): 55.6 %
BH CV ECHO MEAS - ESV(CUBED): 39.3 ML
BH CV ECHO MEAS - ESV(MOD-SP2): 53 ML
BH CV ECHO MEAS - ESV(MOD-SP4): 67 ML
BH CV ECHO MEAS - FS: 37 %
BH CV ECHO MEAS - IVS/LVPW: 1.22 CM
BH CV ECHO MEAS - IVSD: 1.1 CM
BH CV ECHO MEAS - LAT PEAK E' VEL: 12.2 CM/SEC
BH CV ECHO MEAS - LV DIASTOLIC VOL/BSA (35-75): 79.1 CM2
BH CV ECHO MEAS - LV MASS(C)D: 206.7 GRAMS
BH CV ECHO MEAS - LV MAX PG: 3.5 MMHG
BH CV ECHO MEAS - LV MEAN PG: 2 MMHG
BH CV ECHO MEAS - LV SYSTOLIC VOL/BSA (12-30): 35.1 CM2
BH CV ECHO MEAS - LV V1 MAX: 94.1 CM/SEC
BH CV ECHO MEAS - LV V1 VTI: 20.5 CM
BH CV ECHO MEAS - LVIDD: 5.4 CM
BH CV ECHO MEAS - LVIDS: 3.4 CM
BH CV ECHO MEAS - LVOT AREA: 3.1 CM2
BH CV ECHO MEAS - LVOT DIAM: 1.99 CM
BH CV ECHO MEAS - LVPWD: 0.9 CM
BH CV ECHO MEAS - MED PEAK E' VEL: 7.2 CM/SEC
BH CV ECHO MEAS - MR MAX PG: 78.8 MMHG
BH CV ECHO MEAS - MR MAX VEL: 443.9 CM/SEC
BH CV ECHO MEAS - MV A DUR: 0.12 SEC
BH CV ECHO MEAS - MV A MAX VEL: 56.9 CM/SEC
BH CV ECHO MEAS - MV DEC SLOPE: 188.7 CM/SEC2
BH CV ECHO MEAS - MV DEC TIME: 0.29 SEC
BH CV ECHO MEAS - MV E MAX VEL: 51.4 CM/SEC
BH CV ECHO MEAS - MV E/A: 0.9
BH CV ECHO MEAS - MV MAX PG: 2.6 MMHG
BH CV ECHO MEAS - MV MEAN PG: 0.93 MMHG
BH CV ECHO MEAS - MV P1/2T: 99.8 MSEC
BH CV ECHO MEAS - MV V2 VTI: 25.3 CM
BH CV ECHO MEAS - MVA(P1/2T): 2.2 CM2
BH CV ECHO MEAS - MVA(VTI): 2.5 CM2
BH CV ECHO MEAS - PA ACC TIME: 0.1 SEC
BH CV ECHO MEAS - PA V2 MAX: 87.7 CM/SEC
BH CV ECHO MEAS - PULM A REVS DUR: 0.11 SEC
BH CV ECHO MEAS - PULM A REVS VEL: 29.9 CM/SEC
BH CV ECHO MEAS - PULM DIAS VEL: 37.9 CM/SEC
BH CV ECHO MEAS - PULM S/D: 1.74
BH CV ECHO MEAS - PULM SYS VEL: 65.9 CM/SEC
BH CV ECHO MEAS - QP/QS: 0.42
BH CV ECHO MEAS - RAP SYSTOLE: 3 MMHG
BH CV ECHO MEAS - RV MAX PG: 1.06 MMHG
BH CV ECHO MEAS - RV V1 MAX: 51.4 CM/SEC
BH CV ECHO MEAS - RV V1 VTI: 12.9 CM
BH CV ECHO MEAS - RVOT DIAM: 1.62 CM
BH CV ECHO MEAS - RVSP: 24 MMHG
BH CV ECHO MEAS - SUP REN AO DIAM: 2.2 CM
BH CV ECHO MEAS - SV(LVOT): 63.6 ML
BH CV ECHO MEAS - SV(MOD-SP2): 74 ML
BH CV ECHO MEAS - SV(MOD-SP4): 84 ML
BH CV ECHO MEAS - SV(RVOT): 26.8 ML
BH CV ECHO MEAS - SVI(LVOT): 33.4 ML/M2
BH CV ECHO MEAS - SVI(MOD-SP2): 38.8 ML/M2
BH CV ECHO MEAS - SVI(MOD-SP4): 44 ML/M2
BH CV ECHO MEAS - TAPSE (>1.6): 2.3 CM
BH CV ECHO MEAS - TR MAX PG: 21.3 MMHG
BH CV ECHO MEAS - TR MAX VEL: 230.8 CM/SEC
BH CV ECHO MEASUREMENTS AVERAGE E/E' RATIO: 5.3
BH CV STRESS BP STAGE 1: NORMAL
BH CV STRESS BP STAGE 2: NORMAL
BH CV STRESS BP STAGE 3: NORMAL
BH CV STRESS BP STAGE 4: NORMAL
BH CV STRESS DURATION MIN STAGE 1: 3
BH CV STRESS DURATION MIN STAGE 2: 3
BH CV STRESS DURATION MIN STAGE 3: 3
BH CV STRESS DURATION MIN STAGE 4: 3
BH CV STRESS DURATION MIN STAGE 5: 1
BH CV STRESS DURATION SEC STAGE 1: 0
BH CV STRESS DURATION SEC STAGE 2: 0
BH CV STRESS DURATION SEC STAGE 3: 0
BH CV STRESS DURATION SEC STAGE 4: 0
BH CV STRESS DURATION SEC STAGE 5: 0
BH CV STRESS ECHO POST STRESS EJECTION FRACTION EF: 79 %
BH CV STRESS GRADE STAGE 1: 10
BH CV STRESS GRADE STAGE 2: 12
BH CV STRESS GRADE STAGE 3: 14
BH CV STRESS GRADE STAGE 4: 16
BH CV STRESS GRADE STAGE 5: 18
BH CV STRESS HR STAGE 1: 99
BH CV STRESS HR STAGE 2: 108
BH CV STRESS HR STAGE 3: 112
BH CV STRESS HR STAGE 4: 128
BH CV STRESS HR STAGE 5: 138
BH CV STRESS METS STAGE 1: 5
BH CV STRESS METS STAGE 2: 7.5
BH CV STRESS METS STAGE 3: 10
BH CV STRESS METS STAGE 4: 13.5
BH CV STRESS METS STAGE 5: 14
BH CV STRESS PROTOCOL 1: NORMAL
BH CV STRESS SPEED STAGE 1: 1.7
BH CV STRESS SPEED STAGE 2: 2.5
BH CV STRESS SPEED STAGE 3: 3.4
BH CV STRESS SPEED STAGE 4: 4.2
BH CV STRESS SPEED STAGE 5: 5
BH CV STRESS STAGE 1: 1
BH CV STRESS STAGE 2: 2
BH CV STRESS STAGE 3: 3
BH CV STRESS STAGE 4: 4
BH CV STRESS STAGE 5: 5
BH CV XLRA - RV BASE: 3.5 CM
BH CV XLRA - RV LENGTH: 7.9 CM
BH CV XLRA - RV MID: 2.8 CM
BH CV XLRA - TDI S': 11.6 CM/SEC
LEFT ATRIUM VOLUME INDEX: 53.9 ML/M2
MAXIMAL PREDICTED HEART RATE: 150 BPM
PERCENT MAX PREDICTED HR: 92 %
SINUS: 3.5 CM
STJ: 2.8 CM
STRESS BASELINE BP: NORMAL MMHG
STRESS BASELINE HR: 66 BPM
STRESS PERCENT HR: 108 %
STRESS POST ESTIMATED WORKLOAD: 14 METS
STRESS POST EXERCISE DUR MIN: 13 MIN
STRESS POST EXERCISE DUR SEC: 0 SEC
STRESS POST PEAK BP: NORMAL MMHG
STRESS POST PEAK HR: 138 BPM
STRESS TARGET HR: 128 BPM

## 2024-07-09 PROCEDURE — 93325 DOPPLER ECHO COLOR FLOW MAPG: CPT

## 2024-07-09 PROCEDURE — 93320 DOPPLER ECHO COMPLETE: CPT

## 2024-07-09 PROCEDURE — 93350 STRESS TTE ONLY: CPT | Performed by: INTERNAL MEDICINE

## 2024-07-09 PROCEDURE — 25010000002 DIPHENHYDRAMINE PER 50 MG: Performed by: INTERNAL MEDICINE

## 2024-07-09 PROCEDURE — 93350 STRESS TTE ONLY: CPT

## 2024-07-09 PROCEDURE — 93352 ADMIN ECG CONTRAST AGENT: CPT | Performed by: INTERNAL MEDICINE

## 2024-07-09 PROCEDURE — 93325 DOPPLER ECHO COLOR FLOW MAPG: CPT | Performed by: INTERNAL MEDICINE

## 2024-07-09 PROCEDURE — 36415 COLL VENOUS BLD VENIPUNCTURE: CPT

## 2024-07-09 PROCEDURE — 94760 N-INVAS EAR/PLS OXIMETRY 1: CPT

## 2024-07-09 PROCEDURE — 93016 CV STRESS TEST SUPVJ ONLY: CPT | Performed by: INTERNAL MEDICINE

## 2024-07-09 PROCEDURE — 25510000001 PERFLUTREN (DEFINITY) 8.476 MG IN SODIUM CHLORIDE (PF) 0.9 % 10 ML INJECTION: Performed by: INTERNAL MEDICINE

## 2024-07-09 PROCEDURE — 93018 CV STRESS TEST I&R ONLY: CPT | Performed by: INTERNAL MEDICINE

## 2024-07-09 PROCEDURE — 93320 DOPPLER ECHO COMPLETE: CPT | Performed by: INTERNAL MEDICINE

## 2024-07-09 PROCEDURE — 93017 CV STRESS TEST TRACING ONLY: CPT

## 2024-07-09 RX ORDER — DIPHENHYDRAMINE HYDROCHLORIDE 50 MG/ML
50 INJECTION INTRAMUSCULAR; INTRAVENOUS ONCE
Status: COMPLETED | OUTPATIENT
Start: 2024-07-09 | End: 2024-07-09

## 2024-07-09 RX ADMIN — PERFLUTREN 2 ML: 6.52 INJECTION, SUSPENSION INTRAVENOUS at 10:12

## 2024-07-09 RX ADMIN — DIPHENHYDRAMINE HYDROCHLORIDE 50 MG: 50 INJECTION, SOLUTION INTRAMUSCULAR; INTRAVENOUS at 10:39

## 2024-07-09 NOTE — TELEPHONE ENCOUNTER
I'm not sure if these results have been communicated to patient while he was in [facility] or not. Please let him know that stress echo did not show evidence of tightly blocked coronary arteries. His heart is strong and functioning well. There is some leakage from his aortic and mitral valves, but this appears stable overall since echo last year. Would not make any changes based on this.

## 2024-07-09 NOTE — TELEPHONE ENCOUNTER
Patient developed a reaction to Definity which he noticed when he got to his car. Shortly after he returned back to the office symptoms (hives, periorbital edema appear to be improving. He was given Benadryl 50 mg IV and symptoms improved significantly and completely resolved the next 2 hours.  He was watched in [facility] during that time.  He is contacting his wife to pick him up to drive him home.  If he has worsening symptoms he has been instructed to present promptly to the emergency room.  Recommended also that if he has any residual sore throat, rash or edema he should take an additional 25 mg of p.o. Benadryl this evening.    Please call the patient tomorrow and verify he is doing well.

## 2024-07-09 NOTE — TELEPHONE ENCOUNTER
Patient had a stress echo performed and received IV Definity. Following the stress test, patient reported a slight sore throat and left to go home. When patient arrived to his car he noticed generalized itching, hives, swollen lip and scratchy throat. Patient came [place] [facility] [place]. Upon assessment /70, SPO2 97%, lungs clear to auscultation. Hives noted on face, and lower back, rash present on lower back. Swollen lip noted. Dr. Dinero assessed patient and ordered 50 mg IV diphenhydramine to be give. Patient sent to [facility] unit for monitoring over the next hour. Patient instructed to call and arrange for transportation home due to diphenhydramine administration. Advised to not drive himself home.    Allergies updated in chart to reflect new allergy.     Marsha PAULSONN, RN   [facility]

## 2024-07-10 NOTE — TELEPHONE ENCOUNTER
"I called and spoke with Dr. Bassett.  He is doing well this morning.  All of the symptoms from his reaction yesterday have resolved completely.    I let him know of the results of the stress echo.  He verbalizes understanding.    He has one question about the test results: He would like to know more about the finding of \"The left atrial cavity is moderately dilated.\"      Thanks!    Shaylee Beckford RN  [facility] Triage  07/10/24 09:13 EDT      "

## 2024-07-10 NOTE — TELEPHONE ENCOUNTER
This could be due to elevated BP around the time of the testing as well as his allergic response to the Definity. It is also seen with age. This is something we will follow going forward along with his valve leakage.

## 2024-07-10 NOTE — TELEPHONE ENCOUNTER
Thank you for the response, Janet.  I called Dr. Bassett and let him know your response and he verbalizes understanding and was appreciative of the explanation.    Shaylee Beckford RN  [facility] Triage  07/10/24 09:45 EDT

## 2024-08-14 DIAGNOSIS — Z86.010 HX OF ADENOMATOUS COLONIC POLYPS: Primary | ICD-10-CM

## 2024-08-15 RX ORDER — POLYETHYLENE GLYCOL 3350, SODIUM CHLORIDE, SODIUM BICARBONATE, POTASSIUM CHLORIDE 420; 11.2; 5.72; 1.48 G/4L; G/4L; G/4L; G/4L
POWDER, FOR SOLUTION ORAL
Qty: 420 ML | Refills: 0 | Status: SHIPPED | OUTPATIENT
Start: 2024-08-15

## 2024-10-18 ENCOUNTER — PATIENT MESSAGE (OUTPATIENT)
Dept: CARDIOLOGY | Facility: CLINIC | Age: 69
End: 2024-10-18
Payer: MEDICARE

## 2024-10-21 NOTE — TELEPHONE ENCOUNTER
Ma reached out to Dr. Jean office, they will fax most recent office note and labs over, MA confirmed fax number.

## 2024-10-22 NOTE — TELEPHONE ENCOUNTER
Received fax with most recent office visit and most recent labs, have been scanned in and laid of MA's desk.

## 2024-10-25 ENCOUNTER — TELEPHONE (OUTPATIENT)
Dept: CARDIOLOGY | Facility: CLINIC | Age: 69
End: 2024-10-25
Payer: MEDICARE

## 2024-10-30 NOTE — TELEPHONE ENCOUNTER
Please let patient know that lipids look quite well.  Glucose borderline elevated but hemoglobin A1c was normal.  No change but to follow-up with Dr. Cuevas especially as platelets are slightly reduced

## 2024-12-03 ENCOUNTER — ANESTHESIA (OUTPATIENT)
Dept: GASTROENTEROLOGY | Facility: HOSPITAL | Age: 69
End: 2024-12-03
Payer: MEDICARE

## 2024-12-03 ENCOUNTER — ANESTHESIA EVENT (OUTPATIENT)
Dept: GASTROENTEROLOGY | Facility: HOSPITAL | Age: 69
End: 2024-12-03
Payer: MEDICARE

## 2024-12-03 ENCOUNTER — HOSPITAL ENCOUNTER (OUTPATIENT)
Facility: HOSPITAL | Age: 69
Setting detail: HOSPITAL OUTPATIENT SURGERY
Discharge: HOME | End: 2024-12-03
Attending: INTERNAL MEDICINE | Admitting: INTERNAL MEDICINE
Payer: MEDICARE

## 2024-12-03 VITALS
RESPIRATION RATE: 16 BRPM | DIASTOLIC BLOOD PRESSURE: 72 MMHG | HEART RATE: 60 BPM | HEIGHT: 66 IN | WEIGHT: 179 LBS | OXYGEN SATURATION: 96 % | SYSTOLIC BLOOD PRESSURE: 100 MMHG | BODY MASS INDEX: 28.77 KG/M2

## 2024-12-03 DIAGNOSIS — Z86.0101 HX OF ADENOMATOUS COLONIC POLYPS: ICD-10-CM

## 2024-12-03 PROCEDURE — 25010000002 PROPOFOL 1000 MG/100ML EMULSION: Performed by: NURSE ANESTHETIST, CERTIFIED REGISTERED

## 2024-12-03 PROCEDURE — 88305 TISSUE EXAM BY PATHOLOGIST: CPT | Performed by: INTERNAL MEDICINE

## 2024-12-03 PROCEDURE — 25810000003 LACTATED RINGERS PER 1000 ML: Performed by: INTERNAL MEDICINE

## 2024-12-03 PROCEDURE — 25010000002 LIDOCAINE 2% SOLUTION: Performed by: NURSE ANESTHETIST, CERTIFIED REGISTERED

## 2024-12-03 RX ORDER — LIDOCAINE HYDROCHLORIDE 20 MG/ML
INJECTION, SOLUTION INFILTRATION; PERINEURAL AS NEEDED
Status: DISCONTINUED | OUTPATIENT
Start: 2024-12-03 | End: 2024-12-03 | Stop reason: SURG

## 2024-12-03 RX ORDER — SODIUM CHLORIDE 0.9 % (FLUSH) 0.9 %
10 SYRINGE (ML) INJECTION EVERY 12 HOURS SCHEDULED
Status: DISCONTINUED | OUTPATIENT
Start: 2024-12-03 | End: 2024-12-03 | Stop reason: HOSPADM

## 2024-12-03 RX ORDER — SODIUM CHLORIDE 0.9 % (FLUSH) 0.9 %
10 SYRINGE (ML) INJECTION AS NEEDED
Status: DISCONTINUED | OUTPATIENT
Start: 2024-12-03 | End: 2024-12-03 | Stop reason: HOSPADM

## 2024-12-03 RX ORDER — SODIUM CHLORIDE 9 MG/ML
40 INJECTION, SOLUTION INTRAVENOUS AS NEEDED
Status: DISCONTINUED | OUTPATIENT
Start: 2024-12-03 | End: 2024-12-03 | Stop reason: HOSPADM

## 2024-12-03 RX ORDER — PROPOFOL 10 MG/ML
INJECTION, EMULSION INTRAVENOUS AS NEEDED
Status: DISCONTINUED | OUTPATIENT
Start: 2024-12-03 | End: 2024-12-03 | Stop reason: SURG

## 2024-12-03 RX ORDER — SODIUM CHLORIDE, SODIUM LACTATE, POTASSIUM CHLORIDE, CALCIUM CHLORIDE 600; 310; 30; 20 MG/100ML; MG/100ML; MG/100ML; MG/100ML
30 INJECTION, SOLUTION INTRAVENOUS CONTINUOUS PRN
Status: DISCONTINUED | OUTPATIENT
Start: 2024-12-03 | End: 2024-12-03 | Stop reason: HOSPADM

## 2024-12-03 RX ADMIN — PROPOFOL INJECTABLE EMULSION 150 MG: 10 INJECTION, EMULSION INTRAVENOUS at 08:08

## 2024-12-03 RX ADMIN — LIDOCAINE HYDROCHLORIDE 80 MG: 20 INJECTION, SOLUTION INFILTRATION; PERINEURAL at 08:08

## 2024-12-03 RX ADMIN — SODIUM CHLORIDE, SODIUM LACTATE, POTASSIUM CHLORIDE, CALCIUM CHLORIDE 30 ML/HR: 20; 30; 600; 310 INJECTION, SOLUTION INTRAVENOUS at 07:32

## 2024-12-03 RX ADMIN — PROPOFOL INJECTABLE EMULSION 180 MCG/KG/MIN: 10 INJECTION, EMULSION INTRAVENOUS at 08:11

## 2024-12-03 NOTE — ANESTHESIA PREPROCEDURE EVALUATION
Anesthesia Evaluation     Patient summary reviewed and Nursing notes reviewed                Airway   Mallampati: II  Dental      Pulmonary - negative pulmonary ROS   Cardiovascular     ECG reviewed  Rhythm: regular  Rate: normal    (+) hypertension, CAD, hyperlipidemia      Neuro/Psych- negative ROS  GI/Hepatic/Renal/Endo    (+) obesity, GERD, hepatitis A    Musculoskeletal (-) negative ROS    Abdominal    Substance History   (+) alcohol use     OB/GYN negative ob/gyn ROS         Other                    Anesthesia Plan    ASA 3     MAC     (IV contrast dye allergy)  intravenous induction     Anesthetic plan, risks, benefits, and alternatives have been provided, discussed and informed consent has been obtained with: patient and spouse/significant other.    CODE STATUS:

## 2024-12-03 NOTE — ANESTHESIA POSTPROCEDURE EVALUATION
Patient: Beto Bassett Dr.    Procedure Summary       Date: 12/03/24 Room / Location: [facility] ENDOSCOPY 8 / [facility] ENDOSCOPY    Anesthesia Start: 0802 Anesthesia Stop: 0836    Procedure: COLONOSCOPY to cecum/terminal ileum with cold biopsy/polypectomy Diagnosis:       Hx of adenomatous colonic polyps      (Hx of adenomatous colonic polyps [Z86.010])    Surgeons: Nasir Klein MD Provider: Yuriy Perez MD    Anesthesia Type: MAC ASA Status: 3            Anesthesia Type: MAC    Vitals  Vitals Value Taken Time   /72 12/03/24 0855   Temp     Pulse 59 12/03/24 0856   Resp 16 12/03/24 0855   SpO2 96 % 12/03/24 0856   Vitals shown include unfiled device data.        Post Anesthesia Care and Evaluation    Patient location during evaluation: PACU  Patient participation: complete - patient participated  Level of consciousness: awake and alert  Pain management: adequate    Airway patency: patent  Anesthetic complications: No anesthetic complications    Cardiovascular status: acceptable  Respiratory status: acceptable  Hydration status: acceptable    Comments: --------------------            12/03/24               0855     --------------------   BP:       100/72     Pulse:      60       Resp:       16       SpO2:      96%      --------------------

## 2024-12-03 NOTE — DISCHARGE INSTRUCTIONS
For the next 24 hours patient needs to be with a responsible adult.    For THE REST OF TODAY DO NOT drive, operate machinery, appliances, drink alcohol, make important decisions or sign legal documents.    Start with a light or bland diet if you are feeling sick to your stomach otherwise advance to regular diet as tolerated.    Follow recommendations on procedure report if provided by your doctor.    Call Dr Klein     Problems may include but not limited to: large amounts of bleeding, trouble breathing, repeated vomiting, severe unrelieved pain, fever or chills.      If biopsies or polyps were taken, MD will call you with the results in about 7 days. If you don't hear from the MD in 2 weeks, call the number above.

## 2024-12-04 LAB
CYTO UR: NORMAL
LAB AP CASE REPORT: NORMAL
PATH REPORT.FINAL DX SPEC: NORMAL
PATH REPORT.GROSS SPEC: NORMAL

## 2024-12-09 ENCOUNTER — TELEPHONE (OUTPATIENT)
Dept: GASTROENTEROLOGY | Facility: CLINIC | Age: 69
End: 2024-12-09
Payer: MEDICARE

## 2024-12-09 NOTE — TELEPHONE ENCOUNTER
----- Message from Nasir Klein sent at 12/9/2024 12:43 PM EST -----  12/09/24       Please tell Dr. Bassett that the cecal polyp that we removed was not cancerous but was precancerous.  The biopsies of the thickened fold (versus normal variant) came back normal tissue, which is good.  I recommend that he have a repeat colonoscopy in 5 years.  Please send a copy of this report to his PCP.  Day kjnilton

## 2024-12-09 NOTE — PROGRESS NOTES
12/09/24       Please tell Dr. Bassett that the cecal polyp that we removed was not cancerous but was precancerous.  The biopsies of the thickened fold (versus normal variant) came back normal tissue, which is good.  I recommend that he have a repeat colonoscopy in 5 years.  Please send a copy of this report to his PCP.  Day thakur

## 2024-12-09 NOTE — H&P
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 He last had a colonoscopy in 7/23 when he only had a fair colonic prep. I wanted to repeat a colonoscopy in one year after a more aggressive colonoscopy prep.    Past Medical History:   Past Medical History:   Diagnosis Date    Cordon esophagus     Elevated coronary artery calcium score     GERD (gastroesophageal reflux disease)     History of COVID-19     Hyperlipemia     Hypertension     Seasonal allergies        Family History:  Family History   Problem Relation Age of Onset    Diabetes Mother         from liver transplant    Lymphoma Mother         from liver transplant    Hypertension Father     Prostate cancer Father     Stroke Father     No Known Problems Sister     No Known Problems Brother     Heart disease Maternal Grandmother     Heart disease Maternal Grandfather     Heart disease Paternal Grandmother     Heart disease Paternal Grandfather     Heart disease Other        Social History:   reports that he has never smoked. He has never been exposed to tobacco smoke. He has never used smokeless tobacco. He reports current alcohol use of about 7.0 standard drinks of alcohol per week. He reports that he does not use drugs.    Medications:   No medications prior to admission.       Allergies:  Definity [perflutren lipid microsphere] and Zocor [simvastatin]    ROS:    Pertinent items are noted in HPI     Objective     Blood pressure 100/72, pulse 60, resp. rate 16, height 167.6 cm (66\"), weight 81.2 kg (179 lb), SpO2 96%.    Physical Exam   Constitutional: Pt is oriented to person, place, and time and well-developed, well-nourished, and in no distress.   HENT:   Mouth/Throat: Oropharynx is clear and moist.   Neck: Normal range of motion. Neck supple.   Cardiovascular: Normal rate, regular rhythm and normal heart sounds. "    Pulmonary/Chest: Effort normal and breath sounds normal. No respiratory distress. No  wheezes.   Abdominal: Soft. Bowel sounds are normal.   Skin: Skin is warm and dry.   Psychiatric: Mood, memory, affect and judgment normal.     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 He last had a colonoscopy in 7/23 when he only had a fair colonic prep. I wanted to repeat a colonoscopy in one year after a more aggressive colonoscopy prep.    Anticipated Surgical Procedure:  Colonoscopy    The risks, benefits, and alternatives of this procedure have been discussed with the patient or the responsible party- the patient understands and agrees to proceed.    Nasir Klein M.D.

## 2024-12-09 NOTE — TELEPHONE ENCOUNTER
Vm left for pt to call back and or check her my chart     Hm and cs recall 12/3/29  Results routed to the PCP via epic

## 2025-06-24 ENCOUNTER — OFFICE VISIT (OUTPATIENT)
Dept: CARDIOLOGY | Age: 70
End: 2025-06-24
Payer: MEDICARE

## 2025-06-24 VITALS
DIASTOLIC BLOOD PRESSURE: 82 MMHG | BODY MASS INDEX: 29.73 KG/M2 | WEIGHT: 185 LBS | SYSTOLIC BLOOD PRESSURE: 130 MMHG | HEIGHT: 66 IN | HEART RATE: 73 BPM

## 2025-06-24 DIAGNOSIS — I10 PRIMARY HYPERTENSION: ICD-10-CM

## 2025-06-24 DIAGNOSIS — E78.00 PURE HYPERCHOLESTEROLEMIA: ICD-10-CM

## 2025-06-24 DIAGNOSIS — I25.709 CORONARY ARTERY DISEASE INVOLVING CORONARY BYPASS GRAFT OF NATIVE HEART WITH ANGINA PECTORIS: Primary | ICD-10-CM

## 2025-06-24 PROCEDURE — 1160F RVW MEDS BY RX/DR IN RCRD: CPT | Performed by: INTERNAL MEDICINE

## 2025-06-24 PROCEDURE — 1159F MED LIST DOCD IN RCRD: CPT | Performed by: INTERNAL MEDICINE

## 2025-06-24 PROCEDURE — 99213 OFFICE O/P EST LOW 20 MIN: CPT | Performed by: INTERNAL MEDICINE

## 2025-06-24 PROCEDURE — 3079F DIAST BP 80-89 MM HG: CPT | Performed by: INTERNAL MEDICINE

## 2025-06-24 PROCEDURE — 93000 ELECTROCARDIOGRAM COMPLETE: CPT | Performed by: INTERNAL MEDICINE

## 2025-06-24 PROCEDURE — 3075F SYST BP GE 130 - 139MM HG: CPT | Performed by: INTERNAL MEDICINE

## 2025-06-24 NOTE — PROGRESS NOTES
Date of Office Visit: 2025  Encounter Provider: Jamee Dinero MD  Place of Service: [facility] CARDIOLOGY  Patient Name: Beto Bassett Dr.  :1955    Chief complaint  Coronary artery disease    History of Present Illness  Patient is a 70-year-old gentleman with hypertension, hyperlipidemia, pulmonary nodules, Cordon's esophagus who in May 2023 was noted to have a very high calcium score 1135 with significant burden in the right coronary and circumflex vessels.  He was asymptomatic though sedentary.  He had a vascular screening study that was normal.  Last stress echo 2024 showed normal left ventricular systolic function Mulinari hypertrophy, normal diastolic function, moderate left atrial enlargement, mild mitral regurgitation trivial tricuspid regurgitation normal right-sided pressures and no ischemia at 14 METS.    Since last visit patient has been active walking on the treadmill for 30 minutes 5-6 times a week he also doing yard work he has had no chest pain shortness of breath palpitations syncope near syncope.    Past Medical History:   Diagnosis Date    Cordon esophagus     Elevated coronary artery calcium score     GERD (gastroesophageal reflux disease)     History of COVID-19     Hyperlipemia     Hypertension     Seasonal allergies      Past Surgical History:   Procedure Laterality Date    COLONOSCOPY N/A 2017    Procedure: COLONOSCOPY TO CECUM AND INTO TERMINAL ILEUM WITH COLD BIOPSY POLYPECTOMY;  Surgeon: Nasir Klein MD;  Location: [facility] ENDOSCOPY;  Service:     COLONOSCOPY N/A 2023    Procedure: COLONOSCOPY TO CECUM;  Surgeon: Nasir Klein MD;  Location: [facility] ENDOSCOPY;  Service: Gastroenterology;  Laterality: N/A;  HX OF COLON POLYPS  POST:INTERNAL HEMORRHOIDS; FAIR PREP    COLONOSCOPY N/A 12/3/2024    Procedure: COLONOSCOPY to cecum/terminal ileum with cold biopsy/polypectomy;  Surgeon: Nasir Klein MD;  Location: [facility] ENDOSCOPY;  Service:  Gastroenterology;  Laterality: N/A;  Pre:history of colon polyps  Post:polyp, internal hemorrhoids, thickened fold    COLONOSCOPY W/ BIOPSIES  02/06/2009    Dr. Lew; 3-4 mm tubular adenoma    ENDOSCOPY N/A 12/8/2017    Procedure: ESOPHAGOGASTRODUODENOSCOPY WITH COLD BIOPSIES;  Surgeon: Nasir Klein MD;  Location: [facility] ENDOSCOPY;  Service:     ENDOSCOPY N/A 7/8/2023    Procedure: ESOPHAGOGASTRODUODENOSCOPY WITH BIOPSY;  Surgeon: Nasir Klein MD;  Location: [facility] ENDOSCOPY;  Service: Gastroenterology;  Laterality: N/A;  HX OF MIRANDA'S  POST: GASTRITIS; GASTRIC POLYPS    TONSILLECTOMY       Outpatient Medications Prior to Visit   Medication Sig Dispense Refill    aspirin 81 MG chewable tablet Chew 1 tablet Daily.      coenzyme Q10 100 MG capsule Take 2 capsules by mouth Daily.      ezetimibe (ZETIA) 10 MG tablet Take 1 tablet by mouth Daily.      losartan (Cozaar) 25 MG tablet Take 1 tablet by mouth Daily. 90 tablet 1    Multiple Vitamins-Minerals (CENTRUM SILVER 50+MEN PO) Take  by mouth.      pantoprazole (PROTONIX) 40 MG EC tablet Take 1 tablet by mouth Daily.      rosuvastatin (CRESTOR) 40 MG tablet Take 1 tablet by mouth Daily. 90 tablet 3    hepatitis A (HAVRIX) 1440 EL U/ML vaccine Inject  into the shoulder, thigh, or buttocks. (Patient not taking: Reported on 6/24/2025) 1 mL 0    pneumococcal polysaccharide 23-valent (PNEUMOVAX-23) 25 MCG/0.5ML vaccine Inject  into the appropriate muscle as directed by prescriber. (Patient not taking: Reported on 6/24/2025) 0.5 mL 0    polyethylene glycol-electrolytes (NULYTELY) 420 g solution STARTING AT 12 NOON ON DAY PRIOR TO PROCEDURE, DRINK 8 OUNCES BY MOUTH EVERY 30 MINUTES UNTIL GONE OR STOOLS ARE CLEAR 420 mL 0    Zoster Vac Recomb Adjuvanted (SHINGRIX) 50 MCG/0.5ML reconstituted suspension Inject  into the appropriate muscle as directed by prescriber. (Patient not taking: Reported on 6/24/2025) 1 each 0     No facility-administered medications prior to visit.  "      Allergies as of 06/24/2025 - Reviewed 06/24/2025   Allergen Reaction Noted    Definity [perflutren lipid microsphere] Hives, Dermatitis, and Angioedema 07/09/2024    Zocor [simvastatin] Hives 10/27/2017     Social History     Socioeconomic History    Marital status:    Tobacco Use    Smoking status: Never     Passive exposure: Never    Smokeless tobacco: Never    Tobacco comments:     Caffeine use   Vaping Use    Vaping status: Never Used   Substance and Sexual Activity    Alcohol use: Yes     Alcohol/week: 7.0 standard drinks of alcohol     Types: 7 Standard drinks or equivalent per week     Comment: occas    Drug use: No    Sexual activity: Defer     Family History   Problem Relation Age of Onset    Diabetes Mother         from liver transplant    Lymphoma Mother         from liver transplant    Hypertension Father     Prostate cancer Father     Stroke Father     No Known Problems Sister     No Known Problems Brother     Heart disease Maternal Grandmother     Heart disease Maternal Grandfather     Heart disease Paternal Grandmother     Heart disease Paternal Grandfather     Heart disease Other      Review of Systems   Constitutional: Negative for chills, fever, weight gain and weight loss.   Cardiovascular:  Negative for leg swelling.   Respiratory:  Negative for cough, snoring and wheezing.    Hematologic/Lymphatic: Negative for bleeding problem. Does not bruise/bleed easily.   Skin:  Negative for color change.   Musculoskeletal:  Negative for falls, joint pain and myalgias.   Gastrointestinal:  Negative for melena.   Genitourinary:  Negative for hematuria.   Neurological:  Negative for excessive daytime sleepiness.   Psychiatric/Behavioral:  Negative for depression. The patient is not nervous/anxious.         Objective:     Vitals:    06/24/25 0909   BP: 130/82   Pulse: 73   Weight: 83.9 kg (185 lb)   Height: 167.6 cm (66\")     Body mass index is 29.86 kg/m².    Vitals reviewed.   Constitutional:  "      Appearance: Well-developed.   Eyes:      General: No scleral icterus.        Right eye: No discharge.      Conjunctiva/sclera: Conjunctivae normal.      Pupils: Pupils are equal, round, and reactive to light.   HENT:      Head: Normocephalic.      Nose: Nose normal.   Neck:      Thyroid: No thyromegaly.      Vascular: No JVD.   Pulmonary:      Effort: Pulmonary effort is normal. No respiratory distress.      Breath sounds: Normal breath sounds. No wheezing. No rales.   Cardiovascular:      Normal rate. Regular rhythm. Normal S1. Normal S2.       Murmurs: There is no murmur.      No gallop.    Pulses:     Intact distal pulses.      Carotid: 2+ bilaterally.     Radial: 2+ bilaterally.     Femoral: 2+ bilaterally.     Popliteal: 2+ bilaterally.     Dorsalis pedis: 2+ bilaterally.     Posterior tibial: 2+ bilaterally.  Edema:     Peripheral edema absent.   Abdominal:      General: Bowel sounds are normal. There is no distension.      Palpations: Abdomen is soft.      Tenderness: There is no abdominal tenderness. There is no rebound.   Musculoskeletal: Normal range of motion.         General: No tenderness.      Cervical back: Normal range of motion and neck supple. Skin:     General: Skin is warm and dry.      Findings: No erythema or rash.   Neurological:      Mental Status: Alert and oriented to person, place, and time.   Psychiatric:         Behavior: Behavior normal.         Thought Content: Thought content normal.         Judgment: Judgment normal.       Lab Review:   Lab Results - Last 18 Months   Lab Units 10/17/24  0906 01/08/24  1054   WBC K/uL 6.2 6.5   RBC x10(6)/uL 5.2 5.05   HEMOGLOBIN Gram/dL 16 15.5   HEMATOCRIT % 46.9 45.3   MCV fL 90.9 90   MCH pg 30.9 30.7   MCHC Gram/dL 34 34.2   RDW % 13.3 11.9   PLATELETS x10E3/uL  --  139*   EXTERNAL PLATELETS x10(3)/uL 138*  --    NEUTROPHIL % %  --  37   LYMPHOCYTE % %  --  57   MONOCYTES % %  --  5   EOSINOPHIL % %  --  1   BASOPHIL % %  --  0   NEUTROS ABS  x10E3/uL  --  2.4   LYMPHS ABS x10E3/uL  --  3.7*   MONOS ABS x10E3/uL  --  0.3   EOS ABS x10E3/uL  --  0.1   BASOS ABS x10E3/uL  --  0.0   IMM GRAN % %  --  0   IMMATURE GRANS (ABS) x10E3/uL  --  0.0       Lab Results - Last 18 Months   Lab Units 01/08/24  1054   GLUCOSE mg/dL 112*   BUN mg/dL 15   CREATININE mg/dL 0.88   SODIUM mmol/L 140   POTASSIUM mmol/L 4.4   CHLORIDE mmol/L 98   CO2 mmol/L 20   CALCIUM mg/dL 9.6   TOTAL PROTEIN g/dL 7.1   ALBUMIN g/dL 4.8   ALT (SGPT) IU/L 43   AST (SGOT) IU/L 30   ALK PHOS IU/L 63   BILIRUBIN mg/dL 0.5   GLOBULINREF g/dL 2.3   A/G RATIO  2.1   BUN / CREAT RATIO  17   EGFR RESULT mL/min/1.73 93     Lab Results - Last 18 Months   Lab Units 10/17/24  0906 01/08/24  1054   TRIGLYCERIDES mg/dL  --  55   HDL CHOL mg/dL 65 73   LDL CHOL mg/dL 49.2 88   VLDL CHOLESTEROL JEANINE mg/dL  --  11     Lab Results - Last 18 Months   Lab Units 01/08/24  1054   TSH uIU/mL 1.540         ECG 12 Lead    Date/Time: 6/24/2025 9:21 AM  Performed by: Jamee Dinero MD    Authorized by: Jamee Dinero MD  Comparison: compared with previous ECG   Similar to previous ECG  Rhythm: sinus rhythm    Clinical impression: normal ECG            Diagnosis Plan   1. 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 Primary hypertension  ECG 12 Lead      3. Pure hypercholesterolemia          Plan:       1.  Coronary artery disease with high coronary artery calcium Agatston score involving RCA and circumflex predominantly.  Negative stress echo 7/2024 excellent workload normal homocysteine 6/2024.  No anginal symptoms.  Return for follow-up in 1 year  2.  Hypertension.  Borderline elevated today.  At home these are lower typically in the 120s over 70s.  3.  Hyperlipidemia.  Reasonable control on blood work dated 10/2024  4.  Family history of possible aortic aneurysmal disease.  Patient had a negative vascular screening study 6/2023 including abdominal  imaging.  5.  Cordon's esophagus        Time Spent: I spent 25 minutes caring for Beto on this date of service. This time includes time spent by me in the following activities: preparing for the visit, reviewing tests, obtaining and/or reviewing a separately obtained history, performing a medically appropriate examination and/or evaluation, counseling and educating the patient/family/caregiver, documenting information in the medical record, and independently interpreting results and communicating that information with the patient/family/caregiver.   I spent 1 minutes on the separately reported service of ECG. This time is not included in the time used to support the E/M service also reported today.        Your medication list            Accurate as of June 24, 2025 11:59 PM. If you have any questions, ask your nurse or doctor.                CONTINUE taking these medications        Instructions Last Dose Given Next Dose Due   aspirin 81 MG chewable tablet      Chew 1 tablet Daily.       coenzyme Q10 100 MG capsule      Take 2 capsules by mouth Daily.       ezetimibe 10 MG tablet  Commonly known as: ZETIA      Take 1 tablet by mouth Daily.       losartan 25 MG tablet  Commonly known as: Cozaar      Take 1 tablet by mouth Daily.       multivitamin with minerals tablet tablet      Take  by mouth.       pantoprazole 40 MG EC tablet  Commonly known as: PROTONIX      Take 1 tablet by mouth Daily.       rosuvastatin 40 MG tablet  Commonly known as: CRESTOR      Take 1 tablet by mouth Daily.                Patient is no longer taking -.  I corrected the med list to reflect this.  I did not stop these medications.      Dictated utilizing Dragon dictation

## 2025-08-04 ENCOUNTER — TRANSCRIBE ORDERS (OUTPATIENT)
Dept: ADMINISTRATIVE | Facility: HOSPITAL | Age: 70
End: 2025-08-04
Payer: MEDICARE

## 2025-08-13 ENCOUNTER — TRANSCRIBE ORDERS (OUTPATIENT)
Dept: ADMINISTRATIVE | Facility: HOSPITAL | Age: 70
End: 2025-08-13
Payer: MEDICARE

## 2025-08-13 DIAGNOSIS — R10.13 ABDOMINAL PAIN, EPIGASTRIC: Primary | ICD-10-CM

## 2025-08-21 ENCOUNTER — HOSPITAL ENCOUNTER (OUTPATIENT)
Dept: ULTRASOUND IMAGING | Facility: HOSPITAL | Age: 70
Discharge: HOME | End: 2025-08-21
Admitting: INTERNAL MEDICINE
Payer: MEDICARE

## (undated) DEVICE — TUBING, SUCTION, 1/4" X 10', STRAIGHT: Brand: MEDLINE

## (undated) DEVICE — TBG 02 CRUSH RESIST LF CLR 7FT

## (undated) DEVICE — FRCP BX RADJAW4 NDL 2.8 240CM LG OG BX40

## (undated) DEVICE — LN SMPL CO2 SHTRM SD STREAM W/M LUER

## (undated) DEVICE — SENSR O2 OXIMAX FNGR A/ 18IN NONSTR

## (undated) DEVICE — KT ORCA ORCAPOD DISP STRL

## (undated) DEVICE — CANN NASL CO2 TRULINK W/O2 A/

## (undated) DEVICE — ADAPT CLN BIOGUARD AIR/H2O DISP

## (undated) DEVICE — SINGLE-USE BIOPSY FORCEPS: Brand: RADIAL JAW 4

## (undated) DEVICE — CANN O2 ETCO2 FITS ALL CONN CO2 SMPL A/ 7IN DISP LF

## (undated) DEVICE — BITEBLOCK OMNI BLOC

## (undated) DEVICE — THE TORRENT IRRIGATION SCOPE CONNECTOR IS USED WITH THE TORRENT IRRIGATION TUBING TO PROVIDE IRRIGATION FLUIDS SUCH AS STERILE WATER DURING GASTROINTESTINAL ENDOSCOPIC PROCEDURES WHEN USED IN CONJUNCTION WITH AN IRRIGATION PUMP (OR ELECTROSURGICAL UNIT).: Brand: TORRENT

## (undated) DEVICE — Device: Brand: DEFENDO AIR/WATER/SUCTION AND BIOPSY VALVE